# Patient Record
Sex: FEMALE | Race: OTHER | NOT HISPANIC OR LATINO | URBAN - METROPOLITAN AREA
[De-identification: names, ages, dates, MRNs, and addresses within clinical notes are randomized per-mention and may not be internally consistent; named-entity substitution may affect disease eponyms.]

---

## 2020-02-01 ENCOUNTER — INPATIENT (INPATIENT)
Facility: HOSPITAL | Age: 85
LOS: 4 days | Discharge: HOME | DRG: 871 | End: 2020-02-06
Attending: STUDENT IN AN ORGANIZED HEALTH CARE EDUCATION/TRAINING PROGRAM
Payer: MEDICARE

## 2020-02-01 VITALS
OXYGEN SATURATION: 100 % | TEMPERATURE: 98 F | RESPIRATION RATE: 17 BRPM | DIASTOLIC BLOOD PRESSURE: 57 MMHG | SYSTOLIC BLOOD PRESSURE: 106 MMHG | HEART RATE: 70 BPM

## 2020-02-01 DIAGNOSIS — E46 UNSPECIFIED PROTEIN-CALORIE MALNUTRITION: ICD-10-CM

## 2020-02-01 DIAGNOSIS — R63.8 OTHER SYMPTOMS AND SIGNS CONCERNING FOOD AND FLUID INTAKE: ICD-10-CM

## 2020-02-01 DIAGNOSIS — Z91.89 OTHER SPECIFIED PERSONAL RISK FACTORS, NOT ELSEWHERE CLASSIFIED: ICD-10-CM

## 2020-02-01 DIAGNOSIS — J18.9 PNEUMONIA, UNSPECIFIED ORGANISM: ICD-10-CM

## 2020-02-01 DIAGNOSIS — Z86.79 PERSONAL HISTORY OF OTHER DISEASES OF THE CIRCULATORY SYSTEM: ICD-10-CM

## 2020-02-01 DIAGNOSIS — I10 ESSENTIAL (PRIMARY) HYPERTENSION: ICD-10-CM

## 2020-02-01 DIAGNOSIS — R62.7 ADULT FAILURE TO THRIVE: ICD-10-CM

## 2020-02-01 DIAGNOSIS — I26.99 OTHER PULMONARY EMBOLISM WITHOUT ACUTE COR PULMONALE: ICD-10-CM

## 2020-02-01 DIAGNOSIS — A41.9 SEPSIS, UNSPECIFIED ORGANISM: ICD-10-CM

## 2020-02-01 DIAGNOSIS — E11.9 TYPE 2 DIABETES MELLITUS WITHOUT COMPLICATIONS: ICD-10-CM

## 2020-02-01 LAB
GGT SERPL-CCNC: 43 U/L — HIGH (ref 8–40)
GLUCOSE BLDC GLUCOMTR-MCNC: 81 MG/DL — SIGNIFICANT CHANGE UP (ref 70–99)
LACTATE SERPL-SCNC: 1.2 MMOL/L — SIGNIFICANT CHANGE UP (ref 0.5–2)
LACTATE SERPL-SCNC: 2.2 MMOL/L — HIGH (ref 0.5–2)
TROPONIN T SERPL-MCNC: 0.2 NG/ML — CRITICAL HIGH (ref 0–0.01)

## 2020-02-01 PROCEDURE — 71045 X-RAY EXAM CHEST 1 VIEW: CPT | Mod: 26

## 2020-02-01 PROCEDURE — 99223 1ST HOSP IP/OBS HIGH 75: CPT | Mod: GC

## 2020-02-01 PROCEDURE — 71250 CT THORAX DX C-: CPT | Mod: 26

## 2020-02-01 PROCEDURE — 99291 CRITICAL CARE FIRST HOUR: CPT

## 2020-02-01 PROCEDURE — 70450 CT HEAD/BRAIN W/O DYE: CPT | Mod: 26

## 2020-02-01 RX ORDER — DEXTROSE 50 % IN WATER 50 %
25 SYRINGE (ML) INTRAVENOUS ONCE
Refills: 0 | Status: DISCONTINUED | OUTPATIENT
Start: 2020-02-01 | End: 2020-02-05

## 2020-02-01 RX ORDER — BRIMONIDINE TARTRATE 2 MG/MG
1 SOLUTION/ DROPS OPHTHALMIC THREE TIMES A DAY
Refills: 0 | Status: DISCONTINUED | OUTPATIENT
Start: 2020-02-01 | End: 2020-02-06

## 2020-02-01 RX ORDER — FOLIC ACID 0.8 MG
1 TABLET ORAL DAILY
Refills: 0 | Status: DISCONTINUED | OUTPATIENT
Start: 2020-02-01 | End: 2020-02-02

## 2020-02-01 RX ORDER — RIVAROXABAN 15 MG-20MG
15 KIT ORAL
Refills: 0 | Status: DISCONTINUED | OUTPATIENT
Start: 2020-02-01 | End: 2020-02-02

## 2020-02-01 RX ORDER — CALCIUM CARBONATE 500(1250)
1 TABLET ORAL THREE TIMES A DAY
Refills: 0 | Status: DISCONTINUED | OUTPATIENT
Start: 2020-02-01 | End: 2020-02-02

## 2020-02-01 RX ORDER — ASPIRIN/CALCIUM CARB/MAGNESIUM 324 MG
81 TABLET ORAL DAILY
Refills: 0 | Status: DISCONTINUED | OUTPATIENT
Start: 2020-02-01 | End: 2020-02-02

## 2020-02-01 RX ORDER — VANCOMYCIN HCL 1 G
1000 VIAL (EA) INTRAVENOUS ONCE
Refills: 0 | Status: COMPLETED | OUTPATIENT
Start: 2020-02-01 | End: 2020-02-01

## 2020-02-01 RX ORDER — SODIUM CHLORIDE 9 MG/ML
500 INJECTION INTRAMUSCULAR; INTRAVENOUS; SUBCUTANEOUS ONCE
Refills: 0 | Status: COMPLETED | OUTPATIENT
Start: 2020-02-01 | End: 2020-02-01

## 2020-02-01 RX ORDER — DEXTROSE 50 % IN WATER 50 %
12.5 SYRINGE (ML) INTRAVENOUS ONCE
Refills: 0 | Status: DISCONTINUED | OUTPATIENT
Start: 2020-02-01 | End: 2020-02-05

## 2020-02-01 RX ORDER — FOLIC ACID 0.8 MG
1 TABLET ORAL
Qty: 0 | Refills: 0 | DISCHARGE

## 2020-02-01 RX ORDER — DEXTROSE 50 % IN WATER 50 %
15 SYRINGE (ML) INTRAVENOUS ONCE
Refills: 0 | Status: DISCONTINUED | OUTPATIENT
Start: 2020-02-01 | End: 2020-02-05

## 2020-02-01 RX ORDER — PIPERACILLIN AND TAZOBACTAM 4; .5 G/20ML; G/20ML
3.38 INJECTION, POWDER, LYOPHILIZED, FOR SOLUTION INTRAVENOUS ONCE
Refills: 0 | Status: COMPLETED | OUTPATIENT
Start: 2020-02-01 | End: 2020-02-01

## 2020-02-01 RX ORDER — ACETAMINOPHEN 500 MG
650 TABLET ORAL EVERY 6 HOURS
Refills: 0 | Status: DISCONTINUED | OUTPATIENT
Start: 2020-02-01 | End: 2020-02-06

## 2020-02-01 RX ORDER — CEFEPIME 1 G/1
1000 INJECTION, POWDER, FOR SOLUTION INTRAMUSCULAR; INTRAVENOUS EVERY 12 HOURS
Refills: 0 | Status: DISCONTINUED | OUTPATIENT
Start: 2020-02-02 | End: 2020-02-02

## 2020-02-01 RX ORDER — LATANOPROST 0.05 MG/ML
1 SOLUTION/ DROPS OPHTHALMIC; TOPICAL AT BEDTIME
Refills: 0 | Status: DISCONTINUED | OUTPATIENT
Start: 2020-02-01 | End: 2020-02-06

## 2020-02-01 RX ORDER — ATORVASTATIN CALCIUM 80 MG/1
10 TABLET, FILM COATED ORAL AT BEDTIME
Refills: 0 | Status: DISCONTINUED | OUTPATIENT
Start: 2020-02-01 | End: 2020-02-02

## 2020-02-01 RX ORDER — GLUCAGON INJECTION, SOLUTION 0.5 MG/.1ML
1 INJECTION, SOLUTION SUBCUTANEOUS ONCE
Refills: 0 | Status: DISCONTINUED | OUTPATIENT
Start: 2020-02-01 | End: 2020-02-05

## 2020-02-01 RX ORDER — LATANOPROST 0.05 MG/ML
1 SOLUTION/ DROPS OPHTHALMIC; TOPICAL
Qty: 0 | Refills: 0 | DISCHARGE

## 2020-02-01 RX ORDER — BRIMONIDINE TARTRATE 2 MG/MG
1 SOLUTION/ DROPS OPHTHALMIC
Qty: 0 | Refills: 0 | DISCHARGE

## 2020-02-01 RX ORDER — VANCOMYCIN HCL 1 G
1000 VIAL (EA) INTRAVENOUS EVERY 24 HOURS
Refills: 0 | Status: DISCONTINUED | OUTPATIENT
Start: 2020-02-02 | End: 2020-02-02

## 2020-02-01 RX ORDER — SODIUM CHLORIDE 9 MG/ML
1000 INJECTION, SOLUTION INTRAVENOUS
Refills: 0 | Status: DISCONTINUED | OUTPATIENT
Start: 2020-02-01 | End: 2020-02-05

## 2020-02-01 RX ORDER — METOPROLOL TARTRATE 50 MG
1 TABLET ORAL
Qty: 0 | Refills: 0 | DISCHARGE

## 2020-02-01 RX ORDER — INSULIN LISPRO 100/ML
VIAL (ML) SUBCUTANEOUS
Refills: 0 | Status: DISCONTINUED | OUTPATIENT
Start: 2020-02-01 | End: 2020-02-05

## 2020-02-01 RX ADMIN — SODIUM CHLORIDE 500 MILLILITER(S): 9 INJECTION INTRAMUSCULAR; INTRAVENOUS; SUBCUTANEOUS at 18:17

## 2020-02-01 RX ADMIN — Medication 0: at 22:36

## 2020-02-01 RX ADMIN — Medication 250 MILLIGRAM(S): at 18:47

## 2020-02-01 RX ADMIN — PIPERACILLIN AND TAZOBACTAM 200 GRAM(S): 4; .5 INJECTION, POWDER, LYOPHILIZED, FOR SOLUTION INTRAVENOUS at 18:14

## 2020-02-01 NOTE — CONSULT NOTE ADULT - ASSESSMENT
89 y/o female with dementia (AAOx3 at baseline), HTN, DM, SSS s/p pacemaker, osteoporosis, glaucoma  and recent hospitalization at [facility] presenting with generalized weakness and lethargy.    #SIRS  Patient meeting 2/4 SIRS criteria in ED; T 93.2 and WBC 2.49. Recent admissions at outside hospitals with MSSA bacteremia and respiratory failure 2/2 HAP, treated w/ vanc/Zosyn and Intubated . No reported respiratory sx since discharge. CXR with bilateral pleural effusions but not clear infiltrate.  Lactate 2.2.   -c/w Vanc/Zosyn for empiric coverage pending infection work-up.  -f/u UA, Bcx  -f/u CT chest    #Failure to thrive  Patient with generalized lethargy, poor PO intake. Unable to care for herself. May be 2/2 progression of dementia vs acute infectious progress.  Per family would like patient to be DNR/DNI but okay with all other interventions.   -PT and social work consult  -Treatment of possible underlying infection as above.  -Ongoing GOC w/ family  #Encephalopathy  Patient lethargic and not following commands; per family she was previously AAOx3 and active until around October 2019. May be progression of dementia vs toxic metabolic given recent hospitalizations for multiple infections  Moves all extremities spontaneously; no focal deficits  -f/u CT head  -Monitor for changes in mental status    #Troponemia  Troponin elevated to 0.22. EKG with atrial pacing but no acute ST abnormalities. Likely 2/2 demand ischemia.  Echo on 1/21/2020 at [facility] showing normal LV and Rv systolic function normal PA pressure.   -stat cardiac enzymes and EKG if new chest pain    #Pulmonary embolism  Per CTA chest at [facility] showing left segmental PE, was on enoxaparin and transitioned to rivaroxaban.   -c/w rivaroxaban 15 mg BID x 21 days, then bridge to 10 mg QD.    Dispo: 89 y/o female with dementia (AAOx3 at baseline), HTN, DM, SSS s/p pacemaker, osteoporosis, glaucoma  and recent hospitalization at [facility] presenting with generalized weakness and lethargy.    #SIRS  Patient meeting 2/4 SIRS criteria in ED; T 93.2 and WBC 2.49. Recent admissions at outside hospitals with MSSA bacteremia and respiratory failure 2/2 HAP, treated w/ vanc/Zosyn and Intubated . No reported respiratory sx since discharge. CXR with bilateral pleural effusions but not clear infiltrate.  Lactate 2.2. UA with < 5 WBC so low suspicion for UTI.  -c/w Vanc/Zosyn for empiric coverage pending infection work-up.  -f/u UA, Bcx  -f/u CT chest    #Failure to thrive  Patient with generalized lethargy, poor PO intake. Unable to care for herself. May be 2/2 progression of dementia vs acute infectious progress.  Per family would like patient to be DNR/DNI but okay with all other interventions.   -PT and social work consult  -Treatment of possible underlying infection as above.  -Ongoing GOC w/ family  #Encephalopathy  Patient lethargic and not following commands; per family she was previously AAOx3 and active until around October 2019. May be progression of dementia vs toxic metabolic given recent hospitalizations for multiple infections  Moves all extremities spontaneously; no focal deficits  -f/u CT head  -Monitor for changes in mental status    #Troponemia  Troponin elevated to 0.22. EKG with atrial pacing but no acute ST abnormalities. Likely 2/2 demand ischemia.  Echo on 1/21/2020 at [facility] showing normal LV and Rv systolic function normal PA pressure.   -stat cardiac enzymes and EKG if new chest pain    #Pulmonary embolism  Per CTA chest at [facility] showing left segmental PE, was on enoxaparin and transitioned to rivaroxaban.   -c/w rivaroxaban 15 mg BID x 21 days, then bridge to 10 mg QD.    Dispo: 89 y/o female with dementia (AAOx3 at baseline), HTN, DM, SSS s/p pacemaker, osteoporosis, glaucoma  and recent hospitalization at [facility] presenting with generalized weakness and lethargy.    #SIRS  Patient meeting 2/4 SIRS criteria in ED; T 93.2 and WBC 2.49. Recent admissions at outside hospitals with MSSA bacteremia and respiratory failure 2/2 HAP, treated w/ vanc/Zosyn and Intubated . No reported respiratory sx since discharge. CXR with bilateral pleural effusions but not clear infiltrate.  Lactate 2.2. UA with < 5 WBC so low suspicion for UTI.  -c/w Vanc/Zosyn for empiric coverage pending infection work-up.  -f/u UA, Bcx  -f/u CT chest    #Failure to thrive  Patient with generalized lethargy, poor PO intake. Unable to care for herself. May be 2/2 progression of dementia vs acute infectious progress.  Per family would like patient to be DNR/DNI but okay with all other interventions.   -PT and social work consult  -Treatment of possible underlying infection as above.  -Ongoing GOC w/ family  #Encephalopathy  Patient lethargic and not following commands; per family she was previously AAOx3 and active until around October 2019. May be progression of dementia vs toxic metabolic given recent hospitalizations for multiple infections  Moves all extremities spontaneously; no focal deficits  -f/u CT head  -Monitor for changes in mental status    #Troponemia  Troponin elevated to 0.22. EKG with atrial pacing but no acute ST abnormalities. Likely 2/2 demand ischemia.  Echo on 1/21/2020 at [facility] showing normal LV and Rv systolic function normal PA pressure.   -If concern for ACS, would recommend cardiology evaluation  -stat cardiac enzymes and EKG if new chest pain    #Pulmonary embolism  Per CTA chest at [facility] showing left segmental PE, was on enoxaparin and transitioned to rivaroxaban.   -c/w rivaroxaban 15 mg BID x 21 days, then bridge to 10 mg QD.    Dispo: 89 y/o female with dementia (AAOx3 at baseline), HTN, DM, SSS s/p pacemaker, osteoporosis, glaucoma  and recent hospitalization at [facility] presenting with generalized weakness and lethargy.    #SIRS, r/o sepsis   Patient meeting 2/4 SIRS criteria in ED; T 93.2 and WBC 2.49. Concern for sepsis given recent admissions at outside hospitals with MSSA bacteremia and respiratory failure 2/2 HAP, treated w/ vanc/Zosyn and Intubated . No reported respiratory sx since discharge. CXR with bilateral pleural effusions but not clear infiltrate.  Lactate 2.2. UA with < 5 WBC so low suspicion for UTI. Hemodynamically stable in ED.   -recommend w/ c/w Vanc/Zosyn for empiric coverage pending infection work-up.  -f/u UA, Bcx  -f/u CT chest    #Failure to thrive  Patient with generalized lethargy, poor PO intake. Unable to care for herself. May be 2/2 progression of dementia vs acute infectious progress.  Per family would like patient to be DNR/DNI but okay with all other interventions.   -PT and social work consult  -Treatment of possible underlying infection as above.  -Ongoing GOC w/ family    #Encephalopathy  Patient lethargic and not following commands; per family she was previously AAOx3 and active until around October 2019. May be progression of dementia vs toxic metabolic given recent hospitalizations for multiple infections  Moves all extremities spontaneously; no focal deficits  -f/u CT head  -Monitor for changes in mental status    #Troponemia  Troponin elevated to 0.22. EKG with atrial pacing but no acute ST abnormalities. Likely 2/2 demand ischemia.  Echo on 1/21/2020 at [facility] showing normal LV and Rv systolic function normal PA pressure.   -If concern for ACS, would recommend cardiology evaluation  -stat cardiac enzymes and EKG if new chest pain    #Pulmonary embolism  Per CTA chest at [facility] showing left segmental PE, was on enoxaparin and transitioned to rivaroxaban.   -c/w rivaroxaban 15 mg BID x 21 days, then bridge to 10 mg QD.    Dispo: stable for RMF at this time 89 y/o female with dementia (AAOx3 at baseline), HTN, DM, SSS s/p pacemaker, osteoporosis, glaucoma  and recent hospitalization at [facility] presenting with generalized weakness and lethargy.    #SIRS, r/o sepsis   Patient meeting 2/4 SIRS criteria in ED; T 93.2 and WBC 2.49. Concern for sepsis given recent admissions at outside hospitals with MSSA bacteremia and respiratory failure 2/2 HAP, treated w/ vanc/Zosyn and Intubated . No reported respiratory sx since discharge. CXR with bilateral pleural effusions but not clear infiltrate.  Lactate 2.2. UA with < 5 WBC so low suspicion for UTI. Hemodynamically stable in ED.   -recommend w/ c/w Vanc/Zosyn for empiric coverage pending infection work-up.  -f/u UA, Bcx  -f/u CT chest    #Failure to thrive  Patient with generalized lethargy, poor PO intake. Unable to care for herself. In ED patient was awake and somnolent but arousable. May be 2/2 progression of dementia vs acute infectious progress.  Per family would like patient to be DNR/DNI but okay with all other interventions.   -PT and social work consult  -Treatment of possible underlying infection as above.  -Ongoing GOC w/ family    #Encephalopathy  Patient lethargic and not following commands; per family she was previously AAOx3 and active until around October 2019. May be progression of dementia vs toxic metabolic given recent hospitalizations for multiple infections  Moves all extremities spontaneously; no focal deficits  -f/u CT head  -Monitor for changes in mental status    #Troponemia  Troponin elevated to 0.22. EKG with atrial pacing but no acute ST abnormalities. Likely 2/2 demand ischemia.  Echo on 1/21/2020 at [facility] showing normal LV and Rv systolic function normal PA pressure.   -If concern for ACS, would recommend cardiology evaluation  -stat cardiac enzymes and EKG if new chest pain    #Pulmonary embolism  Per CTA chest at [facility] showing left segmental PE, was on enoxaparin and transitioned to rivaroxaban.   -c/w rivaroxaban 15 mg BID x 21 days, then bridge to 10 mg QD.    Dispo: stable for RMF at this time 89 y/o female with dementia (AAOx3 at baseline), HTN, DM, SSS s/p pacemaker, osteoporosis, glaucoma  and recent hospitalization at [facility] presenting with generalized weakness and lethargy.    #SIRS, r/o sepsis   Patient meeting 2/4 SIRS criteria in ED; T 93.2 and WBC 2.49. Concern for sepsis given recent admissions at outside hospitals with MSSA bacteremia and respiratory failure 2/2 HAP, treated w/ vanc/Zosyn and Intubated . No reported respiratory sx since discharge. CXR with bilateral pleural effusions but not clear infiltrate.  Lactate 2.2. UA with < 5 WBC so low suspicion for UTI. Hemodynamically stable in ED.   -recommend w/ c/w Vanc/Zosyn for empiric coverage pending infection work-up.  -f/u UA, Bcx  -f/u CT chest    #Failure to thrive  Patient with generalized lethargy, poor PO intake. Unable to care for herself. In ED patient was awake and somnolent but arousable. May be 2/2 progression of dementia vs acute infectious progress.  Per family would like patient to be DNR/DNI but okay with all other interventions.   -PT and social work consult  -Treatment of possible underlying infection as above.  -Ongoing GOC w/ family    #Encephalopathy  Patient lethargic and not following commands; per family she was previously AAOx3 and active until around October 2019. May be progression of dementia vs toxic metabolic given recent hospitalizations for multiple infections  Moves all extremities spontaneously; no focal deficits  -f/u CT head  -Monitor for changes in mental status    #Troponemia  Troponin elevated to 0.22. EKG with atrial pacing but no acute ST abnormalities. Likely 2/2 demand ischemia.  Echo on 1/21/2020 at [facility] showing normal LV and Rv systolic function normal PA pressure.   -If concern for ACS, would recommend cardiology evaluation  -stat cardiac enzymes and EKG if new chest pain    #Pulmonary embolism  Per CTA chest at [facility] showing left segmental PE, was on enoxaparin and transitioned to rivaroxaban.   -c/w rivaroxaban.     Dispo: stable for RMF at this time

## 2020-02-01 NOTE — H&P ADULT - NSICDXPASTMEDICALHX_GEN_ALL_CORE_FT
PAST MEDICAL HISTORY:  Diabetes mellitus     H/O sick sinus syndrome     Hypertension     Pulmonary embolus

## 2020-02-01 NOTE — H&P ADULT - PROBLEM SELECTOR PLAN 8
F: None  E: Replete PRN  N:  Prophylaxis: Xarelto   Code: DNR/DNI  Dispo: [facility] F: None  E: Replete PRN  N: CC Diet Pureed   Prophylaxis: Xarelto   Code: DNR/DNI  Dispo: [facility] - Home Regimen: Metformin 500mg BID  - MISS  - CC Diet (Pureed) - hold diet pending bedside dysphagia, S+S also ordered.

## 2020-02-01 NOTE — ED ADULT NURSE NOTE - CHIEF COMPLAINT QUOTE
Per ems patient more confused than usual.  Pt recently treated for pneumonia at [facility] 3 days ago.  Hx Dementia, pacemaker, DM.  FS 89

## 2020-02-01 NOTE — H&P ADULT - PROBLEM SELECTOR PLAN 7
- Home Regimen: Metformin 500mg BID  - MISS  - CC Diet (Pureed) - Patient found to have a L subsegmental PE on her last hospitalization and records indicate she was meant to be discharged on Xarelto, which was not sent to the pharmacy.   - Xarelto 15mg BID - this is for 3 weeks, patient presumably has been on A/C for about 1 week during last hospitalization. Would continue for another 2 weeks and switch to 20mg OD dosing thereafter.   - Patient unable to swallow at this time, continue with Enoxaparin 65mg BID (treatment dose).

## 2020-02-01 NOTE — H&P ADULT - PROBLEM SELECTOR PLAN 5
- PPM in place   - Holding Metoprolol 50mg BID in the setting of sepsis - Patient with progressive decline in functional status since Oct 2019 with recurrent hospitalizations. Reportedly, further worsening after recent ICU stay post intubation.   - Family aware of this and understanding of age related dementia and worsening weakness related to hospitalizations.   - Decision made to continue DNR/DNI status   - Would consider palliative care consult.

## 2020-02-01 NOTE — ED PROVIDER NOTE - CRITICAL CARE PROVIDED
conducted a detailed discussion of DNR status/additional history taking/documentation/consult w/ pt's family directly relating to pts condition/interpretation of diagnostic studies/consultation with other physicians/direct patient care (not related to procedure)

## 2020-02-01 NOTE — H&P ADULT - PROBLEM SELECTOR PLAN 6
- Patient found to have a L subsegmental PE on her last hospitalization and records indicate she was meant to be discharged on Xarelto, which was not sent to the pharmacy.   - C/w Xarelto 15mg BID - this is for 3 weeks, patient presumably has been on A/C for about 1 week during last hospitalization. Would continue for another 2 weeks and switch to 20mg OD dosing thereafter.   - C/w Xarelto 15mg BID - PPM in place   - Holding Metoprolol 50mg BID in the setting of sepsis

## 2020-02-01 NOTE — ED PROVIDER NOTE - CLINICAL SUMMARY MEDICAL DECISION MAKING FREE TEXT BOX
89y F with advanced dementia and discharged yesterday after critical illness with pneumonia, possible bacteremia, pulmonary embolism, and respiratory failure with possible syncopal episode today. Will consult cardiology for interrogation of pace maker to determine if rhythm is abnormal, will evaluate for metabolic abnormality and infection. If work up is reassuring will likely discharged as patient is with 24 hour help at home. 89y F with advanced dementia and discharged yesterday after critical illness with pneumonia, possible bacteremia, pulmonary embolism, and respiratory failure with possible syncopal episode today. Will consult cardiology for interrogation of pace maker to determine if rhythm is abnormal, will evaluate for metabolic abnormality and infection. If work up is reassuring will likely discharged as patient is with 24 hour help at home.    Foll 89y F with advanced dementia and discharged yesterday after critical illness with pneumonia, possible bacteremia, pulmonary embolism, and respiratory failure with possible syncopal episode today. Will consult cardiology for interrogation of pace maker to determine if rhythm is abnormal, will evaluate for metabolic abnormality and infection. If work up is reassuring will likely discharged as patient is with 24 hour help at home.  FOLLOW UP:   due to hypothermia identified, elevated lactic, and question of infiltrate on xray , consider possible sepsis, antibiotics empirically given,  ICU consulted and advised ok for floor, less likely cardiac event, trop improved, no dysrthmia here,   DNR DNI as per discussion w granddaughter, hypothermia improving.     Foll

## 2020-02-01 NOTE — PATIENT PROFILE ADULT - NSPROEDALEARNPREFOTH_GEN_A_NUR
audio/skill demonstration/group instruction/pictorial/verbal instruction/video/computer/internet/individual instruction/written material

## 2020-02-01 NOTE — ED ADULT NURSE NOTE - NSIMPLEMENTINTERV_GEN_ALL_ED
Implemented All Fall with Harm Risk Interventions:  [place] to call system. Call bell, personal items and telephone within reach. Instruct patient to call for assistance. Room bathroom lighting operational. Non-slip footwear when patient is off stretcher. Physically safe environment: no spills, clutter or unnecessary equipment. Stretcher in lowest position, wheels locked, appropriate side rails in place. Provide visual cue, wrist band, yellow gown, etc. Monitor gait and stability. Monitor for mental status changes and reorient to person, place, and time. Review medications for side effects contributing to fall risk. Reinforce activity limits and safety measures with patient and family. Provide visual clues: red socks.

## 2020-02-01 NOTE — H&P ADULT - PROBLEM SELECTOR PLAN 4
- Patient with progressive decline in functional status since Oct 2019 with recurrent hospitalizations. Reportedly, further worsening after recent ICU stay post intubation.   - Family aware of this and understanding of age related dementia and worsening weakness related to hospitalizations.   - Decision made to continue DNR/DNI status   - Would consider palliative care consult. - CT Chest demonstrated mild fat stranding - clinically, difficult to assess pain as patient is non-verbal, however, with palpation, patient actively tried to remove hand.   - Alk Phos elevated, f/u GGT  - F/u RUQ sonogram   - C/w antibiotics as above for coverage of enteric pathogens   - Should this represent acute cholecystitis, family have determined they would not pursue surgery.

## 2020-02-01 NOTE — H&P ADULT - NSHPPHYSICALEXAM_GEN_ALL_CORE
PHYSICAL EXAM:  Constitutional: Patient seated comfortably in bed, of appropriate color, nutrition, and hydration.   HEENT: PERRLA, Normal Range of eye movement with no complaint of diplopia, Normal Hearing  Neck: No LAD, No JVD  Back: Normal spine flexure, No CVA tenderness  Respiratory: Normal air entry, Lungs clear to auscultation b/l w/o wheeze or crepitations.   Cardiovascular: S1 and S2 present - no additional abnormal sounds or murmurs. Normal rhythm and rate of pulse.   Gastrointestinal: BS+, soft, NT/ND  Extremities: No peripheral edema  Vascular: 2+ peripheral pulses  Neurological: A/O x 3, CN V-XII grossly intact.  Psychiatric: Normal mood, normal affect  Musculoskeletal: 5/5 strength b/l upper and lower extremities  Skin: No rashes PHYSICAL EXAM:  Constitutional: Elderly AA female, lying in bed, awake but not participatory in exam, non-verbal.   HEENT: PERRLA  Neck: No LAD, No JVD  Back: Normal spine flexure, No CVA tenderness  Respiratory: On RA, normal inspiratory effort. Coarse crackles at both lung bases.   Cardiovascular: S1 and S2 present - no additional abnormal sounds or murmurs. PPM in place.   Gastrointestinal: Difficult to assess, but, when pressing the RUQ, patient tried to remove my hands. Non-distended. BS+   Extremities: 1-2+ edema in the b/l UE and LE.   Vascular: 2+ peripheral pulses  Neurological: A/O x 0. No obvious CN deficit. Not participating in strength exam, but able to fight my advances, moving all 4 limbs. Normal tone.

## 2020-02-01 NOTE — H&P ADULT - NSHPLABSRESULTS_GEN_ALL_CORE
9.2    2.49  )-----------( 89       ( 2020 14:55 )             27.4     144  |  109<H>  |  16  ----------------------------<  87  4.5   |  23  |  1.08    Ca    8.7      2020 14:55  Mg     1.7     -    TPro  6.2  /  Alb  2.4<L>  /  TBili  0.6  /  DBili  x   /  AST  41<H>  /  ALT  17  /  AlkPhos  125<H>  02        Urinalysis Basic - ( 2020 15:37 )    Color: Yellow / Appearance: Cloudy / S.025 / pH: x  Gluc: x / Ketone: NEGATIVE  / Bili: Negative / Urobili: 0.2 E.U./dL   Blood: x / Protein: Trace mg/dL / Nitrite: POSITIVE   Leuk Esterase: NEGATIVE / RBC: 5-10 /HPF / WBC < 5 /HPF   Sq Epi: x / Non Sq Epi: Moderate /HPF / Bacteria: Present /HPF    PT/INR - ( 2020 14:55 )   PT: 12.2 sec;   INR: 1.07          PTT - ( 2020 14:55 )  PTT:45.0 sec    Lactate Trend  - @ 18:59 Lactate:1.2    @ 15:00 Lactate:2.2       CARDIAC MARKERS ( 2020 18:59 )  x     / 0.20 ng/mL / x     / x     / x      CARDIAC MARKERS ( 2020 14:55 )  x     / 0.22 ng/mL / x     / x     / x

## 2020-02-01 NOTE — CONSULT NOTE ADULT - SUBJECTIVE AND OBJECTIVE BOX
***NOTE INCOMPLETE***    ICU CONSULT    89 y/o female with dementia, pace maker, and recent hospitalization at [facility] with initial peritonitis and developed pneumonia and new pulmonary embolism, and MSSA bacteremia and respiratory failure intubated in ICU and discharged yesterday. Given Xarelto for pulmonary embolism as per discharge instructions however not at the pharmacy when she went to pick it up today. Patient's granddaughter reports she is surprised her grandmother was discharged yesterday and went to help her with the 24 hour aid today and patient was slumped over in wheelchair, granddaughter is unsure if she syncopized or fell asleep. Patient's granddaughter reports she feels that patient was not ready to be discharged and is persistently weak, no fever, no cough, and no other complaints of pain.     Granddaughter reports confusion as initially told that her grandmother would be discharged with pic line but on review of discharge paperwork infectious disease at [place] determined suspected blood cultures were contaminated and advised discontinuation of antibiotics.    In the ED:  Initial vital signs: T: 93.2-98.2 F, HR: 69-70, BP: 104//57, R: 16-17, SpO2: 100% on RA  Labs: significant for WBC 2.49, Hb 9.2, lactate 2.2, albumin 2.2, troponin T 0.22, pro-BNP 5695  Imaging:  CXR: small bilateral effusions, R > L. Cannot exclude infiltrate.   EKG: low voltage, atrial pacemaker, no acute ST abnormalities   Medications: vancomycin, Zosyn, 500 cc bolus   Consults: ICU    Past Medical History: as above    Past Surgical History    Allergies - no known drug allergies    Family History - non-contributory    Social History - denies smoking, alcohol use, or illicit drug use     Review of Systems - negative except per HPI Above       Vital Signs Last 24 Hrs  T(C): 34.8 (2020 17:40), Max: 36.8 (2020 14:32)  T(F): 94.6 (2020 17:40), Max: 98.2 (2020 14:32)  HR: 69 (2020 17:40) (69 - 70)  BP: 104/61 (2020 17:40) (104/61 - 106/57)  BP(mean): --  RR: 16 (2020 17:40) (16 - 17)  SpO2: 100% (2020 17:40) (100% - 100%)    PHYSICAL EXAM   General: no acute distress; appears stated age  HEENT: normocephalic, atraumatic, non-icteric sclera, PERRL, EOMI, oropharynx clear  Neck: supple; no JVD or LAD  Chest: no gross deformities  Lungs: clear to ausculation bilaterally; no wheezing, crackles, or rhonchi  Heart: regular rate and rhythm; S1 and S2; no murmurs, rubs, or gallops  Abdomen: soft, non-tender, non-distended; normal bowel sounds; no hepatosplenomegaly  MSK: back non-tender to palpation  Extremities: well-perfused, 2+ extremity pulses, no cyanosis or edema  Skin: no rashes  Neuro: AAOx3; no gross focal deficits       LABS                        9.2    2.49  )-----------( 89       ( 2020 14:55 )             27.4         144  |  109<H>  |  16  ----------------------------<  87  4.5   |  23  |  1.08    Ca    8.7      2020 14:55  Mg     1.7         TPro  6.2  /  Alb  2.4<L>  /  TBili  0.6  /  DBili  x   /  AST  41<H>  /  ALT  17  /  AlkPhos  125<H>      PT/INR - ( 2020 14:55 )   PT: 12.2 sec;   INR: 1.07          PTT - ( 2020 14:55 )  PTT:45.0 sec  Lactate, Blood: 2.2 mmol/L (20 @ 15:00)    Urinalysis Basic - ( 2020 15:37 )    Color: Yellow / Appearance: Cloudy / S.025 / pH: x  Gluc: x / Ketone: NEGATIVE  / Bili: Negative / Urobili: 0.2 E.U./dL   Blood: x / Protein: Trace mg/dL / Nitrite: POSITIVE   Leuk Esterase: NEGATIVE / RBC: 5-10 /HPF / WBC < 5 /HPF   Sq Epi: x / Non Sq Epi: Moderate /HPF / Bacteria: Present /HPF            IMAGING       INTERPRETATION: Clinical History: Pneumonia     Frontal examination of the chest demonstrates the heart to be within normal   limits in transverse diameter. Pacemaker overlies left chest wall. Clips and   pacer wires overlie right atrium and right ventricle respectively. Small   bilateral effusions. Underlying basilar infiltrates cannot be excluded.   Calcification noted involving thoracic aorta. Degenerative changes thoracic   spine.     Impression: Small bilateral effusions right greater than left. Underlying   basilar infiltrate cannot be excluded ***NOTE INCOMPLETE***    ICU CONSULT    89 y/o female with dementia (AAOx3 at [facility]), HTN, DM, SSS s/p pacemaker, osteoporosis, glaucoma  and recent hospitalization at [facility] presenting with generalized weakness and lethargy. Patient hospitalized at [place] with initial parotitis and MSSA bacteremia. Discharged home but presented to [facility] on 1/15/2020 with poor PO intake and generalized weakness since discharge. Admitted to ICU and  treated with vancomycin and cefepime for HAP and intubated and admitted to ICU, and found to have new left segmental PE pulmonary embolism, treated with enoxaparin and then switched to rivaroxaban. Patient intially w/ PICC line placed but MSSA bacteremia deemed by [facility] ID team to be contaminant and PICC line removed. Patient was discharged home yesterday, and  given Xarelto for pulmonary embolism as per discharge instructions however not at the pharmacy when she went to pick it up today.     Patient's granddaughter reports she is surprised her grandmother was discharged yesterday and went to help her with the 24 hour aid today and patient was slumped over in wheelchair, but denies any head injury or LOC. Patient's granddaughter reports she feels that patient was not ready to be discharged and since discharge is persistently weak. Patient unable to verbalize complaints but per complaints, but family, patient with no fever, no cough, sore throat SOB, or pain complaints since discharge. Reports at baseline she was AAOx3 and very active until around 2019; since then she has had multiple hospitalizations and has persistently remained lethargic.     In the ED:  Initial vital signs: T: 93.2-98.2 F, HR: 69-70, BP: 104//57, R: 16-17, SpO2: 100% on RA  Labs: significant for WBC 2.49, Hb 9.2, lactate 2.2, albumin 2.2, troponin T 0.22, pro-BNP 5695  Imaging:  CXR: small bilateral effusions, R > L. Cannot exclude infiltrate.   EKG: low voltage, atrial pacemaker, no acute ST abnormalities   Medications: vancomycin, Zosyn, 500 cc bolus   Consults: ICU    Past Medical History: as above    Past Surgical History: cataract surgery     Allergies - no known drug allergies    Family History - non-contributory    Social History - no smoking, alcohol use, or illicit drug use     Review of Systems - unable to obtain as patient non-verbal at this time    Home Meds:  aspirin 81 mg QD   atorvastatin 10 mg QHS  bisacodyl 5 mg QD PRN  brimonidine eye drops  calcium carbonate 1250 mg QD  folic acid 1 mg QD  latanoprost 0.005% 1 drop both eyes QD  metformin 500 mg BID  metoprolol tartrate 50 mg BID  Miralax 1 packet daily  rivaroxaban 15 mg BID    Vital Signs Last 24 Hrs  T(C): 34.8 (2020 17:40), Max: 36.8 (2020 14:32)  T(F): 94.6 (2020 17:40), Max: 98.2 (2020 14:32)  HR: 69 (2020 17:40) (69 - 70)  BP: 104/61 (2020 17:40) (104/61 - 106/57)  BP(mean): --  RR: 16 (2020 17:40) (16 - 17)  SpO2: 100% (2020 17:40) (100% - 100%)    PHYSICAL EXAM   General: elderly female; no acute distress; appears stated age  HEENT: normocephalic, atraumatic, non-icteric sclera, PERRL, dry mucous membranes   Neck: supple  Lungs: clear to ausculation bilaterally; no wheezing, crackles, or rhonchi  Heart: regular rate and rhythm; S1 and S2; no murmurs, rubs, or gallops  Abdomen: soft, non-tender, non-distended; normal bowel sounds; no hepatosplenomegaly  Extremities: well-perfused, 2+ extremity pulses, no cyanosis or edema  Skin: poor skin turgor   Neuro: awake but lethargic, oriented x0. Moves all extremities spontaneously but unable to follow commands.        LABS                        9.2    2.49  )-----------( 89       ( 2020 14:55 )             27.4     02-    144  |  109<H>  |  16  ----------------------------<  87  4.5   |  23  |  1.08    Ca    8.7      2020 14:55  Mg     1.7     02-    TPro  6.2  /  Alb  2.4<L>  /  TBili  0.6  /  DBili  x   /  AST  41<H>  /  ALT  17  /  AlkPhos  125<H>      PT/INR - ( 2020 14:55 )   PT: 12.2 sec;   INR: 1.07          PTT - ( 2020 14:55 )  PTT:45.0 sec  Lactate, Blood: 2.2 mmol/L (20 @ 15:00)    Urinalysis Basic - ( 2020 15:37 )    Color: Yellow / Appearance: Cloudy / S.025 / pH: x  Gluc: x / Ketone: NEGATIVE  / Bili: Negative / Urobili: 0.2 E.U./dL   Blood: x / Protein: Trace mg/dL / Nitrite: POSITIVE   Leuk Esterase: NEGATIVE / RBC: 5-10 /HPF / WBC < 5 /HPF   Sq Epi: x / Non Sq Epi: Moderate /HPF / Bacteria: Present /HPF            IMAGING       INTERPRETATION: Clinical History: Pneumonia     Frontal examination of the chest demonstrates the heart to be within normal   limits in transverse diameter. Pacemaker overlies left chest wall. Clips and   pacer wires overlie right atrium and right ventricle respectively. Small   bilateral effusions. Underlying basilar infiltrates cannot be excluded.   Calcification noted involving thoracic aorta. Degenerative changes thoracic   spine.     Impression: Small bilateral effusions right greater than left. Underlying   basilar infiltrate cannot be excluded ICU CONSULT    89 y/o female with dementia (AAOx3 at [facility]), HTN, DM, SSS s/p pacemaker, osteoporosis, glaucoma  and recent hospitalization at [facility] presenting with generalized weakness and lethargy. Patient hospitalized at [place] with initial parotitis and MSSA bacteremia. Discharged home but presented to [facility] on 1/15/2020 with poor PO intake and generalized weakness since discharge. Admitted to ICU and  treated with vancomycin and cefepime for HAP and intubated and admitted to ICU, and found to have new left segmental PE pulmonary embolism, treated with enoxaparin and then switched to rivaroxaban. Patient intially w/ PICC line placed but MSSA bacteremia deemed by [facility] ID team to be contaminant and PICC line removed. Patient was discharged home yesterday, and  given Xarelto for pulmonary embolism as per discharge instructions however not at the pharmacy when she went to pick it up today.     Patient's granddaughter reports she is surprised her grandmother was discharged yesterday and went to help her with the 24 hour aid today and patient was slumped over in wheelchair, but denies any head injury or LOC. Patient's granddaughter reports she feels that patient was not ready to be discharged and since discharge is persistently weak. Patient unable to verbalize complaints but per complaints, but family, patient with no fever, no cough, sore throat SOB, or pain complaints since discharge. Reports at baseline she was AAOx3 and very active until around 2019; since then she has had multiple hospitalizations and has persistently remained lethargic.     In the ED:  Initial vital signs: T: 93.2-98.2 F, HR: 69-70, BP: 104//57, R: 16-17, SpO2: 100% on RA  Labs: significant for WBC 2.49, Hb 9.2, lactate 2.2, albumin 2.2, troponin T 0.22, pro-BNP 5695  Imaging:  CXR: small bilateral effusions, R > L. Cannot exclude infiltrate.   EKG: low voltage, atrial pacemaker, no acute ST abnormalities   Medications: vancomycin, Zosyn, 500 cc bolus   Consults: ICU    Past Medical History: as above    Past Surgical History: cataract surgery     Allergies - no known drug allergies    Family History - non-contributory    Social History - no smoking, alcohol use, or illicit drug use     Review of Systems - unable to obtain as patient non-verbal at this time    Home Meds:  aspirin 81 mg QD   atorvastatin 10 mg QHS  bisacodyl 5 mg QD PRN  brimonidine eye drops  calcium carbonate 1250 mg QD  folic acid 1 mg QD  latanoprost 0.005% 1 drop both eyes QD  metformin 500 mg BID  metoprolol tartrate 50 mg BID  Miralax 1 packet daily  rivaroxaban 15 mg BID    Vital Signs Last 24 Hrs  T(C): 34.8 (2020 17:40), Max: 36.8 (2020 14:32)  T(F): 94.6 (2020 17:40), Max: 98.2 (2020 14:32)  HR: 69 (2020 17:40) (69 - 70)  BP: 104/61 (2020 17:40) (104/61 - 106/57)  BP(mean): --  RR: 16 (2020 17:40) (16 - 17)  SpO2: 100% (2020 17:40) (100% - 100%)    PHYSICAL EXAM   General: elderly female; no acute distress; appears stated age  HEENT: normocephalic, atraumatic, non-icteric sclera, PERRL, dry mucous membranes   Neck: supple  Lungs: clear to ausculation bilaterally; no wheezing, crackles, or rhonchi  Heart: regular rate and rhythm; S1 and S2; no murmurs, rubs, or gallops  Abdomen: soft, non-tender, non-distended; normal bowel sounds; no hepatosplenomegaly  Extremities: well-perfused, 2+ extremity pulses, no cyanosis or edema  Skin: poor skin turgor   Neuro: awake but lethargic, oriented x0. Moves all extremities spontaneously but unable to follow commands.        LABS                        9.2    2.49  )-----------( 89       ( 2020 14:55 )             27.4     02-01    144  |  109<H>  |  16  ----------------------------<  87  4.5   |  23  |  1.08    Ca    8.7      2020 14:55  Mg     1.7     02-01    TPro  6.2  /  Alb  2.4<L>  /  TBili  0.6  /  DBili  x   /  AST  41<H>  /  ALT  17  /  AlkPhos  125<H>      PT/INR - ( 2020 14:55 )   PT: 12.2 sec;   INR: 1.07          PTT - ( 2020 14:55 )  PTT:45.0 sec  Lactate, Blood: 2.2 mmol/L (20 @ 15:00)    Urinalysis Basic - ( 2020 15:37 )    Color: Yellow / Appearance: Cloudy / S.025 / pH: x  Gluc: x / Ketone: NEGATIVE  / Bili: Negative / Urobili: 0.2 E.U./dL   Blood: x / Protein: Trace mg/dL / Nitrite: POSITIVE   Leuk Esterase: NEGATIVE / RBC: 5-10 /HPF / WBC < 5 /HPF   Sq Epi: x / Non Sq Epi: Moderate /HPF / Bacteria: Present /HPF            IMAGING       INTERPRETATION: Clinical History: Pneumonia     Frontal examination of the chest demonstrates the heart to be within normal   limits in transverse diameter. Pacemaker overlies left chest wall. Clips and   pacer wires overlie right atrium and right ventricle respectively. Small   bilateral effusions. Underlying basilar infiltrates cannot be excluded.   Calcification noted involving thoracic aorta. Degenerative changes thoracic   spine.     Impression: Small bilateral effusions right greater than left. Underlying   basilar infiltrate cannot be excluded ICU CONSULT    89 y/o female with dementia (AAOx3 at [facility]), HTN, DM, SSS s/p pacemaker, osteoporosis, glaucoma  and recent hospitalization at [facility] presenting with generalized weakness and lethargy. Patient hospitalized at [place] with initial parotitis and MSSA bacteremia. Discharged home but presented to [facility] on 1/15/2020 with poor PO intake and generalized weakness since discharge. Admitted to ICU and  treated with vancomycin and cefepime for HAP and intubated and admitted to ICU, and found to have new left segmental PE pulmonary embolism, treated with enoxaparin and then switched to rivaroxaban. Patient intially w/ PICC line placed but MSSA bacteremia deemed by [facility] ID team to be contaminant and PICC line removed. Patient was discharged home yesterday, and  given Xarelto for pulmonary embolism as per discharge instructions however not at the pharmacy when she went to pick it up today.     Patient's granddaughter reports she is surprised her grandmother was discharged yesterday and went to help her with the 24 hour aid today and patient was slumped over in wheelchair, but denies any head injury or LOC. Patient's granddaughter reports she feels that patient was not ready to be discharged and since discharge is persistently weak. Patient unable to verbalize complaints but per complaints, but family, patient with no fever, no cough, sore throat SOB, or pain complaints since discharge. Reports at baseline she was AAOx3 and very active until around 2019; since then she has had multiple hospitalizations and has persistently remained lethargic.     In the ED:  Initial vital signs: T: 93.2-98.2 F, HR: 69-70, BP: 104//57, R: 16-17, SpO2: 100% on RA  Labs: significant for WBC 2.49, Hb 9.2, lactate 2.2, albumin 2.2, troponin T 0.22, pro-BNP 5695  Imaging:  CXR: small bilateral effusions, R > L. Cannot exclude infiltrate.   EKG: low voltage, atrial pacemaker, no acute ST abnormalities   Medications: vancomycin, Zosyn, 500 cc bolus   Consults: ICU    Past Medical History: as above    Past Surgical History: cataract surgery     Allergies - no known drug allergies    Family History - non-contributory    Social History - no smoking, alcohol use, or illicit drug use     Review of Systems - unable to obtain as patient non-verbal at this time    Home Meds:  aspirin 81 mg QD   atorvastatin 10 mg QHS  bisacodyl 5 mg QD PRN  brimonidine eye drops  calcium carbonate 1250 mg QD  folic acid 1 mg QD  latanoprost 0.005% 1 drop both eyes QD  metformin 500 mg BID  metoprolol tartrate 50 mg BID  Miralax 1 packet daily  rivaroxaban 15 mg BID    Vital Signs Last 24 Hrs  T(C): 34.8 (2020 17:40), Max: 36.8 (2020 14:32)  T(F): 94.6 (2020 17:40), Max: 98.2 (2020 14:32)  HR: 69 (2020 17:40) (69 - 70)  BP: 104/61 (2020 17:40) (104/61 - 106/57)  BP(mean): --  RR: 16 (2020 17:40) (16 - 17)  SpO2: 100% (2020 17:40) (100% - 100%)    PHYSICAL EXAM   General: elderly female; no acute distress; appears stated age  HEENT: normocephalic, atraumatic, non-icteric sclera, PERRL, dry mucous membranes   Neck: supple  Lungs: clear to ausculation bilaterally; no wheezing, crackles, or rhonchi  Heart: regular rate and rhythm; S1 and S2; no murmurs, rubs, or gallops  Abdomen: soft, non-tender, non-distended; normal bowel sounds; no hepatosplenomegaly  Extremities: well-perfused, 2+ extremity pulses, no cyanosis or edema  Skin: poor skin turgor   Neuro: awake, somnolent but arousable, oriented x0. Moves all extremities spontaneously but unable to follow commands.        LABS                        9.2    2.49  )-----------( 89       ( 2020 14:55 )             27.4     02-01    144  |  109<H>  |  16  ----------------------------<  87  4.5   |  23  |  1.08    Ca    8.7      2020 14:55  Mg     1.7     02-01    TPro  6.2  /  Alb  2.4<L>  /  TBili  0.6  /  DBili  x   /  AST  41<H>  /  ALT  17  /  AlkPhos  125<H>      PT/INR - ( 2020 14:55 )   PT: 12.2 sec;   INR: 1.07          PTT - ( 2020 14:55 )  PTT:45.0 sec  Lactate, Blood: 2.2 mmol/L (20 @ 15:00)    Urinalysis Basic - ( 2020 15:37 )    Color: Yellow / Appearance: Cloudy / S.025 / pH: x  Gluc: x / Ketone: NEGATIVE  / Bili: Negative / Urobili: 0.2 E.U./dL   Blood: x / Protein: Trace mg/dL / Nitrite: POSITIVE   Leuk Esterase: NEGATIVE / RBC: 5-10 /HPF / WBC < 5 /HPF   Sq Epi: x / Non Sq Epi: Moderate /HPF / Bacteria: Present /HPF            IMAGING       INTERPRETATION: Clinical History: Pneumonia     Frontal examination of the chest demonstrates the heart to be within normal   limits in transverse diameter. Pacemaker overlies left chest wall. Clips and   pacer wires overlie right atrium and right ventricle respectively. Small   bilateral effusions. Underlying basilar infiltrates cannot be excluded.   Calcification noted involving thoracic aorta. Degenerative changes thoracic   spine.     Impression: Small bilateral effusions right greater than left. Underlying   basilar infiltrate cannot be excluded

## 2020-02-01 NOTE — ED PROVIDER NOTE - NEUROLOGICAL, MLM
Alert, no focal deficits, no motor or sensory deficits; muttering unintelligibly, granddaughter reports mental status at baseline; oriented to granddaughter but not to self, place, or time.

## 2020-02-01 NOTE — ED ADULT TRIAGE NOTE - CHIEF COMPLAINT QUOTE
Per ems patient more confused than usual.  Pt [facility].  Hx Dementia, pacemaker, DM.  FS 89

## 2020-02-01 NOTE — ED ADULT NURSE REASSESSMENT NOTE - NS ED NURSE REASSESS COMMENT FT1
Pt straight catheterized for urine as ordered by MD Aparicio. Pt currently resting comfortably in bed under bear hugger. Will continue with plan of care.

## 2020-02-01 NOTE — ED PROVIDER NOTE - OBJECTIVE STATEMENT
89y F with a history of dementia, pace maker, and recent hospitalization with initial peritonitis and developed pneumonia and new pulmonary embolism, and mssa bacteremia and respiratory failure intubated in ICU and discharged yesterday. Given Xarelto for pulmonary embolism as per discharge instructions however not at the pharmacy when she went to pick it up today. Patient's granddaughter reports she is surprised her grandmother was discharged yesterday and went to help her with the 24 hour aid today and patient was slumped over in wheelchair, granddaughter is unsure if she syncopized or fell asleep. Patient's granddaughter reports she feels that patient was not ready to be discharged and is persistently weak, no fever, no cough, and no other complaints of pain. Granddaughter reports confusion as initially told that her grandmother would be discharged with pic line but on review of discharge paperwork infectious disease at [place] determined suspected blood cultures were contaminated and advised discontinuation of antibiotics. 89y F with a history of dementia, pace maker, and recent hospitalization with initial parotitis and developed pneumonia and new pulmonary embolism, and mssa bacteremia and respiratory failure intubated in ICU and discharged yesterday. Given Xarelto for pulmonary embolism as per discharge instructions however not at the pharmacy when she went to pick it up today. Patient's granddaughter reports she is surprised her grandmother was discharged yesterday and went to help her with the 24 hour aid today and patient was slumped over in wheelchair, granddaughter is unsure if she syncopized or fell asleep. Patient's granddaughter reports she feels that patient was not ready to be discharged and is persistently weak, no fever, no cough, and no other complaints of pain. Granddaughter reports confusion as initially told that her grandmother would be discharged with pic line but on review of discharge paperwork infectious disease at [place] determined suspected blood cultures were contaminated and advised discontinuation of antibiotics. 89y F with a history of dementia, pace maker, and recent hospitalization with initial parotidis and developed pneumonia and new pulmonary embolism, and mssa bacteremia and respiratory failure intubated in ICU and discharged yesterday. Given Xarelto for pulmonary embolism as per discharge instructions however not at the pharmacy when she went to pick it up today. Patient's granddaughter reports she is surprised her grandmother was discharged yesterday and went to help her with the 24 hour aid today and patient was slumped over in wheelchair, granddaughter is unsure if she syncopized or fell asleep. Patient's granddaughter reports she feels that patient was not ready to be discharged and is persistently weak, no fever, no cough, and no other complaints of pain. Granddaughter reports confusion as initially told that her grandmother would be discharged with pic line but on review of discharge paperwork infectious disease at [place] determined suspected blood cultures were contaminated and advised discontinuation of antibiotics.

## 2020-02-01 NOTE — H&P ADULT - PROBLEM SELECTOR PLAN 1
- Patient meeting SIRS criteria w/ hypothermia and leucopenia w/ source being attributed to a pneumonia, as evident on CT Chest.   - Lactate 2.2 - cleared w/ 500cc NS  - Patient did not receive 30cc/kg fluid resuscitation given presence of b/l pleural effusions and elevated BNP. However, patient is warm w/ palpable pulses on exam.   - UA negative   - Other potential source is her gallbladder, w/ CT reading stranding around organ and stones. Alk Phos elevated.   - C/w antibiotic treatment as below   - F/u blood cultures - Patient meeting SIRS criteria w/ hypothermia and leucopenia w/ source being attributed to a pneumonia, as evident on CT Chest.   - Lactate 2.2 - cleared w/ 500cc NS  - Patient did not receive 30cc/kg fluid resuscitation given presence of b/l pleural effusions and elevated BNP. However, patient is warm w/ palpable pulses on exam.   - UA negative   - Other potential source is her gallbladder, w/ CT reading stranding around organ and stones. Alk Phos elevated.   - C/w antibiotic treatment as below   - F/u blood cultures    #Demand Ischemia  - Initial troponinemia, since peaked in the setting of sepsis   - No distinct ischemic changes on EKG  - Elevated BNP w/ edema   - F/u ECHO - Patient meeting SIRS criteria w/ hypothermia and leucopenia w/ source being attributed to a pneumonia, as evident on CT Chest with possible cholecystitis evident as well.   - Lactate 2.2 - cleared w/ 500cc NS  - Patient did not receive 30cc/kg fluid resuscitation given presence of b/l pleural effusions and elevated BNP. However, patient is warm w/ palpable pulses on exam.   - UA negative   - Other potential source is her gallbladder, w/ CT reading stranding around organ and stones. Alk Phos and ggt elevated.   - C/w antibiotic treatment as below   - F/u blood cultures    #Demand Ischemia  - Initial troponinemia, since peaked in the setting of sepsis   - No distinct ischemic changes on EKG  - Elevated BNP w/ edema   - F/u ECHO

## 2020-02-01 NOTE — H&P ADULT - PROBLEM SELECTOR PLAN 3
- CT Chest demonstrated mild fat stranding - clinically, difficult to assess pain as patient is non-verbal, however, with palpation, patient actively tried to remove hand.   - Alk Phos elevated, f/u GGT  - F/u RUQ sonogram   - C/w antibiotics as above for coverage of enteric pathogens   - Should this represent acute cholecystitis, family have determined they would not pursue surgery. - CXR illustrated b/l effusions and possible infiltrate, with consolidation ultimately confirmed on subsequent CT Chest.   - Given recent hospital DC yesterday, will treat this as a hospital acquired pneumonia   - F/y RVP  - C/w Vanc 1g QD and Zosyn   - Patient at this time is on room air with appropriate respiratory status   - F/u daily CXR

## 2020-02-01 NOTE — H&P ADULT - HISTORY OF PRESENT ILLNESS
89 y/o female with dementia (AAOx3 in Oct 2019, w/ decline), HTN, DM, SSS s/p pacemaker, Osteoporosis, Glaucoma, Pulmonary Embolism and recent hospitalization at [facility] presenting with generalized weakness and lethargy. Patient hospitalized at [place] with initial parotitis and MSSA bacteremia. Discharged home but presented to [facility] on 1/15/2020 with poor PO intake and generalized weakness since discharge. Admitted to ICU and  treated with vancomycin and cefepime for HAP and intubated and admitted to ICU, and found to have new left segmental PE pulmonary embolism, treated with enoxaparin and then switched to rivaroxaban. Patient intially w/ PICC line placed but MSSA bacteremia deemed by [facility] ID team to be contaminant and PICC line removed. Patient was discharged home yesterday, and given Xarelto for pulmonary embolism as per discharge instructions however not at the pharmacy when she went to pick it up today.     Patient's granddaughter reports she is surprised her grandmother was discharged yesterday and went to help her with the 24 hour aid today and patient was slumped over in wheelchair, but denies any head injury or LOC. Patient's granddaughter reports she feels that patient was not ready to be discharged and since discharge is persistently weak. Patient unable to verbalize complaints but per complaints, but family, patient with no fever, no cough, sore throat SOB, or pain complaints since discharge. Reports at baseline she was AAOx3 and very active until around October 2019; since then she has had multiple hospitalizations and has persistently remained lethargic.    In the ED, vitals as follows: Tmin: 93.2F | HR: 69-76bpm | BP: /53-64mmHg | RR: 16/min | SpO2: % RA    Labs significant for: WBC 2.49, Hgb 9.2 (Elev RDW), Plt 89, Lactate 2.2 -> 1.2, Albumin 2.4, Alk Phos 125, AST 41, ALT 17, Trop 0.22 -> 0.2, BNP > 5,000, Blood Gas wnl, UA [SG 1.025, Hyaline and Granular casts]   Imaging:  CTH  No hydrocephalus, midline shift, acute intracranial hemorrhage or demarcated territorial infarct.    CT Chest  Moderate-sized bilateral pleural effusions with overlying consolidation. Cannot exclude underlying bibasilar infiltrates. Correlate clinically.  Cholelithiasis. Punctate calcifications within the region of the distal cystic/common hepatic duct suspicious for cortical cholelithiasis. Possible mild stranding of the fat surrounding the gallbladder may represent active inflammatory change. Correlate whether the patient is experiencing any tenderness within the right upper quadrant.  EKG:    Patient was administered: Vanc/Zosyn, NS 500cc 89 y/o female with dementia (AAOx3 in Oct 2019, w/ decline), HTN, DM, SSS s/p pacemaker, Osteoporosis, Glaucoma, Pulmonary Embolism and recent hospitalization at [facility] presenting with generalized weakness and lethargy. Patient hospitalized at [place] with initial parotitis and MSSA bacteremia. Discharged home but presented to [facility] on 1/15/2020 with poor PO intake and generalized weakness since discharge. Admitted to ICU after being intubated for hypoxic respiratory failure and was treated with vancomycin and cefepime for HAP. She was also found to have a new left segmental PE pulmonary embolism, treated with enoxaparin and then switched to rivaroxaban. Patient initially w/ PICC line placed but MSSA bacteremia deemed by [facility] ID team to be contaminant and PICC line removed. Patient was discharged home yesterday, and given Xarelto for pulmonary embolism as per discharge instructions however not at the pharmacy when she went to pick it up today.     Patient's granddaughter reports she is surprised her grandmother was discharged yesterday and went to help her with the 24 hour aid today and patient was slumped over in wheelchair, but denies any head injury or LOC. Patient's granddaughter reports she feels that patient was not ready to be discharged and since discharge is persistently weak. Patient unable to verbalize complaints but, per family, patient with no fever, no cough, sore throat SOB, or pain since discharge. Reports at baseline she was AAOx3 and very active until around October 2019; since then she has had multiple hospitalizations and has persistently remained lethargic.     In the ED, vitals as follows: Tmin: 93.2F | HR: 69-76bpm | BP: /53-64mmHg | RR: 16/min | SpO2: % RA    Labs significant for: WBC 2.49, Hgb 9.2 (Elev RDW), Plt 89, Lactate 2.2 -> 1.2, Albumin 2.4, Alk Phos 125, AST 41, ALT 17, Trop 0.22 -> 0.2, BNP > 5,000, Blood Gas wnl, UA [SG 1.025, Hyaline and Granular casts]   Imaging:  CTH  No hydrocephalus, midline shift, acute intracranial hemorrhage or demarcated territorial infarct.    CT Chest  Moderate-sized bilateral pleural effusions with overlying consolidation. Cannot exclude underlying bibasilar infiltrates. Correlate clinically.  Cholelithiasis. Punctate calcifications within the region of the distal cystic/common hepatic duct suspicious for cortical cholelithiasis. Possible mild stranding of the fat surrounding the gallbladder may represent active inflammatory change. Correlate whether the patient is experiencing any tenderness within the right upper quadrant.    Patient was administered: Vanc/Zosyn, NS 500cc

## 2020-02-01 NOTE — H&P ADULT - PROBLEM SELECTOR PLAN 2
- CXR illustrated b/l effusions and possible infiltrate, with consolidation ultimately confirmed on subsequent CT Chest.   - Given recent hospital DC yesterday, will treat this as a hospital acquired pneumonia   - C/w Vanc + Zosyn   - Patient at this time is on room air with appropriate respiratory status   - F/u daily CXR - CXR illustrated b/l effusions and possible infiltrate, with consolidation ultimately confirmed on subsequent CT Chest.   - Given recent hospital DC yesterday, will treat this as a hospital acquired pneumonia   - C/w Vanc 1g QD and Cefepime 1g q 12 hours based on Cr Cl   - Patient at this time is on room air with appropriate respiratory status   - F/u daily CXR - Patient with leucopenia, anemia, and thrombocytopenia   - No e/o bleeding or bruising   - Suspect related to marrow suppression in the setting of sepsis   - C/w enoxaparin for tx of PE - hold if platelet count drops further.   - Will work up anemia w/ iron studies, B12, Folate - Patient with leucopenia, anemia, and thrombocytopenia   - No e/o bleeding or bruising   - Suspect related to marrow suppression in the setting of sepsis   - C/w enoxaparin for tx of PE - hold if platelet count drops further.   - Will work up anemia w/ iron studies, B12, Folate, retic count

## 2020-02-01 NOTE — H&P ADULT - ASSESSMENT
89 y/o female with dementia (AAOx3 in Oct 2019, w/ decline), HTN, DM, SSS s/p pacemaker, osteoporosis, glaucoma  and recent hospitalization at [facility] presenting with generalized weakness and lethargy. Patient admitted for severe sepsis secondary to pneumonia, covering for hospital acquired sources.

## 2020-02-01 NOTE — ED PROVIDER NOTE - ENMT, MLM
Airway patent, Nasal mucosa clear. Mouth with normal mucosa. Throat has no vesicles, no oropharyngeal exudates and uvula is midline. Dry mucous membranes.

## 2020-02-01 NOTE — H&P ADULT - PROBLEM SELECTOR PLAN 9
1) PCP Contacted on Admission: (Y/N) --> Name & Phone #:  2) Date of Contact with PCP:  3) PCP Contacted at Discharge: (Y/N)  4) Summary of Handoff Given to PCP:   5) Post-Discharge Appointment Date and Location: F: None  E: Replete PRN  N: CC Diet Pureed   Prophylaxis: Xarelto   Code: DNR/DNI  Dispo: [facility]

## 2020-02-01 NOTE — ED ADULT NURSE REASSESSMENT NOTE - NS ED NURSE REASSESS COMMENT FT1
Upon assessment stage 2 pressure ulcer noted to right sacrum. Pt repeat rectal temperature 94.5, MD Aparicio aware, pt maintained on bear hugger. Will continue with plan of care.

## 2020-02-01 NOTE — ED ADULT NURSE NOTE - OBJECTIVE STATEMENT
Pt is a 89 y/o female BIBA accompanied by home health aid & granddaughter c/o "altered mental status." As per granddaughter and HHA pt normally A&Ox4 and talking, upon ED arrival pt disoriented to person, place and time but speaking coherently, as per granddaughter this started "after she passed out while sitting down." HHA denies N/V/D, fever/chills, head injury/falls, or any complaints of pain prior to syncope. Upon assessment full ROM in all extremities,  strength equal and strong bilaterally, edema noted to LUE. Pt's granddaughter reports pt was recently discharged from "ICU in [place] after being taken off a ventilator."

## 2020-02-02 DIAGNOSIS — D61.818 OTHER PANCYTOPENIA: ICD-10-CM

## 2020-02-02 LAB
ANION GAP SERPL CALC-SCNC: 11 MMOL/L — SIGNIFICANT CHANGE UP (ref 5–17)
BUN SERPL-MCNC: 18 MG/DL — SIGNIFICANT CHANGE UP (ref 7–23)
CALCIUM SERPL-MCNC: 8.5 MG/DL — SIGNIFICANT CHANGE UP (ref 8.4–10.5)
CHLORIDE SERPL-SCNC: 110 MMOL/L — HIGH (ref 96–108)
CO2 SERPL-SCNC: 21 MMOL/L — LOW (ref 22–31)
CREAT SERPL-MCNC: 1.11 MG/DL — SIGNIFICANT CHANGE UP (ref 0.5–1.3)
FERRITIN SERPL-MCNC: 498 NG/ML — HIGH (ref 15–150)
FOLATE SERPL-MCNC: 15 NG/ML — SIGNIFICANT CHANGE UP
GLUCOSE BLDC GLUCOMTR-MCNC: 68 MG/DL — LOW (ref 70–99)
GLUCOSE BLDC GLUCOMTR-MCNC: 80 MG/DL — SIGNIFICANT CHANGE UP (ref 70–99)
GLUCOSE SERPL-MCNC: 83 MG/DL — SIGNIFICANT CHANGE UP (ref 70–99)
HBA1C BLD-MCNC: 5.1 % — SIGNIFICANT CHANGE UP (ref 4–5.6)
HCT VFR BLD CALC: 24.1 % — LOW (ref 34.5–45)
HGB BLD-MCNC: 8.2 G/DL — LOW (ref 11.5–15.5)
IRON SATN MFR SERPL: 47 % — SIGNIFICANT CHANGE UP (ref 14–50)
IRON SATN MFR SERPL: 61 UG/DL — SIGNIFICANT CHANGE UP (ref 30–160)
MAGNESIUM SERPL-MCNC: 1.6 MG/DL — SIGNIFICANT CHANGE UP (ref 1.6–2.6)
MCHC RBC-ENTMCNC: 33.1 PG — SIGNIFICANT CHANGE UP (ref 27–34)
MCHC RBC-ENTMCNC: 34 GM/DL — SIGNIFICANT CHANGE UP (ref 32–36)
MCV RBC AUTO: 97.2 FL — SIGNIFICANT CHANGE UP (ref 80–100)
NRBC # BLD: 0 /100 WBCS — SIGNIFICANT CHANGE UP (ref 0–0)
PHOSPHATE SERPL-MCNC: 3 MG/DL — SIGNIFICANT CHANGE UP (ref 2.5–4.5)
PLATELET # BLD AUTO: 77 K/UL — LOW (ref 150–400)
POTASSIUM SERPL-MCNC: 3.4 MMOL/L — LOW (ref 3.5–5.3)
POTASSIUM SERPL-SCNC: 3.4 MMOL/L — LOW (ref 3.5–5.3)
RAPID RVP RESULT: SIGNIFICANT CHANGE UP
RBC # BLD: 2.48 M/UL — LOW (ref 3.8–5.2)
RBC # FLD: 19.5 % — HIGH (ref 10.3–14.5)
SODIUM SERPL-SCNC: 142 MMOL/L — SIGNIFICANT CHANGE UP (ref 135–145)
TIBC SERPL-MCNC: 129 UG/DL — LOW (ref 220–430)
UIBC SERPL-MCNC: 68 UG/DL — LOW (ref 110–370)
VIT B12 SERPL-MCNC: 1374 PG/ML — HIGH (ref 232–1245)
WBC # BLD: 3.34 K/UL — LOW (ref 3.8–10.5)
WBC # FLD AUTO: 3.34 K/UL — LOW (ref 3.8–10.5)

## 2020-02-02 PROCEDURE — 99233 SBSQ HOSP IP/OBS HIGH 50: CPT | Mod: GC

## 2020-02-02 RX ORDER — FOLIC ACID 0.8 MG
1 TABLET ORAL DAILY
Refills: 0 | Status: DISCONTINUED | OUTPATIENT
Start: 2020-02-02 | End: 2020-02-06

## 2020-02-02 RX ORDER — PIPERACILLIN AND TAZOBACTAM 4; .5 G/20ML; G/20ML
4.5 INJECTION, POWDER, LYOPHILIZED, FOR SOLUTION INTRAVENOUS EVERY 8 HOURS
Refills: 0 | Status: DISCONTINUED | OUTPATIENT
Start: 2020-02-02 | End: 2020-02-02

## 2020-02-02 RX ORDER — POTASSIUM CHLORIDE 20 MEQ
10 PACKET (EA) ORAL
Refills: 0 | Status: COMPLETED | OUTPATIENT
Start: 2020-02-02 | End: 2020-02-02

## 2020-02-02 RX ORDER — KETOROLAC TROMETHAMINE 30 MG/ML
15 SYRINGE (ML) INJECTION ONCE
Refills: 0 | Status: DISCONTINUED | OUTPATIENT
Start: 2020-02-02 | End: 2020-02-02

## 2020-02-02 RX ORDER — MAGNESIUM SULFATE 500 MG/ML
2 VIAL (ML) INJECTION ONCE
Refills: 0 | Status: COMPLETED | OUTPATIENT
Start: 2020-02-02 | End: 2020-02-02

## 2020-02-02 RX ORDER — ENOXAPARIN SODIUM 100 MG/ML
65 INJECTION SUBCUTANEOUS EVERY 12 HOURS
Refills: 0 | Status: DISCONTINUED | OUTPATIENT
Start: 2020-02-02 | End: 2020-02-02

## 2020-02-02 RX ORDER — ENOXAPARIN SODIUM 100 MG/ML
65 INJECTION SUBCUTANEOUS EVERY 12 HOURS
Refills: 0 | Status: DISCONTINUED | OUTPATIENT
Start: 2020-02-02 | End: 2020-02-06

## 2020-02-02 RX ORDER — HALOPERIDOL DECANOATE 100 MG/ML
1 INJECTION INTRAMUSCULAR ONCE
Refills: 0 | Status: COMPLETED | OUTPATIENT
Start: 2020-02-02 | End: 2020-02-02

## 2020-02-02 RX ORDER — POTASSIUM CHLORIDE 20 MEQ
20 PACKET (EA) ORAL ONCE
Refills: 0 | Status: DISCONTINUED | OUTPATIENT
Start: 2020-02-02 | End: 2020-02-02

## 2020-02-02 RX ORDER — CEFTRIAXONE 500 MG/1
1000 INJECTION, POWDER, FOR SOLUTION INTRAMUSCULAR; INTRAVENOUS ONCE
Refills: 0 | Status: COMPLETED | OUTPATIENT
Start: 2020-02-02 | End: 2020-02-03

## 2020-02-02 RX ORDER — HALOPERIDOL DECANOATE 100 MG/ML
1 INJECTION INTRAMUSCULAR THREE TIMES A DAY
Refills: 0 | Status: DISCONTINUED | OUTPATIENT
Start: 2020-02-02 | End: 2020-02-03

## 2020-02-02 RX ADMIN — CEFEPIME 100 MILLIGRAM(S): 1 INJECTION, POWDER, FOR SOLUTION INTRAMUSCULAR; INTRAVENOUS at 01:47

## 2020-02-02 RX ADMIN — ENOXAPARIN SODIUM 65 MILLIGRAM(S): 100 INJECTION SUBCUTANEOUS at 17:39

## 2020-02-02 RX ADMIN — BRIMONIDINE TARTRATE 1 DROP(S): 2 SOLUTION/ DROPS OPHTHALMIC at 17:34

## 2020-02-02 RX ADMIN — ENOXAPARIN SODIUM 65 MILLIGRAM(S): 100 INJECTION SUBCUTANEOUS at 06:44

## 2020-02-02 RX ADMIN — HALOPERIDOL DECANOATE 1 MILLIGRAM(S): 100 INJECTION INTRAMUSCULAR at 11:00

## 2020-02-02 RX ADMIN — Medication 50 GRAM(S): at 09:41

## 2020-02-02 RX ADMIN — Medication 100 MILLIEQUIVALENT(S): at 09:42

## 2020-02-02 RX ADMIN — Medication 15 MILLIGRAM(S): at 00:56

## 2020-02-02 RX ADMIN — BRIMONIDINE TARTRATE 1 DROP(S): 2 SOLUTION/ DROPS OPHTHALMIC at 22:23

## 2020-02-02 RX ADMIN — PIPERACILLIN AND TAZOBACTAM 25 GRAM(S): 4; .5 INJECTION, POWDER, LYOPHILIZED, FOR SOLUTION INTRAVENOUS at 06:44

## 2020-02-02 RX ADMIN — Medication 15 MILLIGRAM(S): at 00:39

## 2020-02-02 RX ADMIN — BRIMONIDINE TARTRATE 1 DROP(S): 2 SOLUTION/ DROPS OPHTHALMIC at 06:45

## 2020-02-02 NOTE — DIETITIAN INITIAL EVALUATION ADULT. - PROBLEM SELECTOR PLAN 1
- Patient meeting SIRS criteria w/ hypothermia and leucopenia w/ source being attributed to a pneumonia, as evident on CT Chest with possible cholecystitis evident as well.   - Lactate 2.2 - cleared w/ 500cc NS  - Patient did not receive 30cc/kg fluid resuscitation given presence of b/l pleural effusions and elevated BNP. However, patient is warm w/ palpable pulses on exam.   - UA negative   - Other potential source is her gallbladder, w/ CT reading stranding around organ and stones. Alk Phos and ggt elevated.   - C/w antibiotic treatment as below   - F/u blood cultures    #Demand Ischemia  - Initial troponinemia, since peaked in the setting of sepsis   - No distinct ischemic changes on EKG  - Elevated BNP w/ edema   - F/u ECHO

## 2020-02-02 NOTE — SWALLOW BEDSIDE ASSESSMENT ADULT - NS SPL SWALLOW CLINIC TRIAL FT
Pt was initially calm upon SLP arrival. However, when SLP attempted to offer her liquid, she became increasingly agitated. She began drinking from the straw but bit down on it and refused to release it for a prolonged period of time. Pt drank ~2oz of water, occasionally holding liquid in her mouth and then expelling it back through the straw. Pt refused to allow SLP palpate for laryngeal elevation and began screaming, "No!" and swatting at the therapist. Pt refused all solid trials.

## 2020-02-02 NOTE — DIETITIAN INITIAL EVALUATION ADULT. - PROBLEM SELECTOR PLAN 7
- Patient found to have a L subsegmental PE on her last hospitalization and records indicate she was meant to be discharged on Xarelto, which was not sent to the pharmacy.   - Xarelto 15mg BID - this is for 3 weeks, patient presumably has been on A/C for about 1 week during last hospitalization. Would continue for another 2 weeks and switch to 20mg OD dosing thereafter.   - Patient unable to swallow at this time, continue with Enoxaparin 65mg BID (treatment dose).

## 2020-02-02 NOTE — PROGRESS NOTE ADULT - SUBJECTIVE AND OBJECTIVE BOX
INTERVAL HPI/OVERNIGHT EVENTS:  Patient was seen and examined at bedside.     Patient agitated at times, vocalizes but does not respond, even when spoken to in Creole. Unable to obtain ROS. She was too combative to have US abdomen.         VITAL SIGNS:  T(F): 97.2 (20 @ 05:11)  HR: 78 (20 @ 05:11)  BP: 123/77 (20 @ 05:11)  RR: 18 (20 @ 05:11)  SpO2: 100% (20 @ 05:11)  Wt(kg): --    PHYSICAL EXAM:    Constitutional: female, elderly, frail, , agitated  HEENT: arcus senilis, no JVD, MMM, edentulous  Respiratory: CTA b/l, however limited exam due to poor patient cooperation  Cardiovascular: RRR, normal S1S2, no M/R/G  Gastrointestinal: soft, patient combative during exam  Extremities: Warm, well perfused, no edema, no clubbing  Neurological: AAOx0, unable to assess CN,  Skin: normal temperature, warm, dry    MEDICATIONS  (STANDING):  brimonidine 0.2% Ophthalmic Solution 1 Drop(s) Both EYES three times a day  dextrose 5%. 1000 milliLiter(s) (50 mL/Hr) IV Continuous <Continuous>  dextrose 50% Injectable 12.5 Gram(s) IV Push once  dextrose 50% Injectable 25 Gram(s) IV Push once  dextrose 50% Injectable 25 Gram(s) IV Push once  enoxaparin Injectable 65 milliGRAM(s) SubCutaneous every 12 hours  folic acid Injectable 1 milliGRAM(s) IV Push daily  insulin lispro (HumaLOG) corrective regimen sliding scale   SubCutaneous Before meals and at bedtime  latanoprost 0.005% Ophthalmic Solution 1 Drop(s) Both EYES at bedtime  levoFLOXacin  Tablet 750 milliGRAM(s) Oral every 48 hours    MEDICATIONS  (PRN):  acetaminophen    Suspension .. 650 milliGRAM(s) Oral every 6 hours PRN Mild Pain (1 - 3)  dextrose 40% Gel 15 Gram(s) Oral once PRN Blood Glucose LESS THAN 70 milliGRAM(s)/deciliter  glucagon  Injectable 1 milliGRAM(s) IntraMuscular once PRN Glucose LESS THAN 70 milligrams/deciliter  haloperidol    Injectable 1 milliGRAM(s) IntraMuscular three times a day PRN agitation      Allergies    No Known Allergies    Intolerances        LABS:                        8.2    3.34  )-----------( 77       ( 2020 06:52 )             24.1     02    142  |  110<H>  |  18  ----------------------------<  83  3.4<L>   |  21<L>  |  1.11    Ca    8.5      2020 06:52  Phos  3.0       Mg     1.6         TPro  6.2  /  Alb  2.4<L>  /  TBili  0.6  /  DBili  x   /  AST  41<H>  /  ALT  17  /  AlkPhos  125<H>      PT/INR - ( 2020 14:55 )   PT: 12.2 sec;   INR: 1.07          PTT - ( 2020 14:55 )  PTT:45.0 sec  Urinalysis Basic - ( 2020 15:37 )    Color: Yellow / Appearance: Cloudy / S.025 / pH: x  Gluc: x / Ketone: NEGATIVE  / Bili: Negative / Urobili: 0.2 E.U./dL   Blood: x / Protein: Trace mg/dL / Nitrite: POSITIVE   Leuk Esterase: NEGATIVE / RBC: 5-10 /HPF / WBC < 5 /HPF   Sq Epi: x / Non Sq Epi: Moderate /HPF / Bacteria: Present /HPF      CAPILLARY BLOOD GLUCOSE      POCT Blood Glucose.: 68 mg/dL (2020 12:46)  POCT Blood Glucose.: 80 mg/dL (2020 09:40)  POCT Blood Glucose.: 81 mg/dL (2020 22:07) INTERVAL HPI/OVERNIGHT EVENTS:  Patient was seen and examined at bedside.     Patient agitated at times, vocalizes but does not respond, even when spoken to in Creole. Unable to obtain ROS. She was too combative to have US abdomen. Goals of care discussion continued today with son, Albert Villalpando [phone], at bedside.    VITAL SIGNS:  T(F): 97.2 (20 @ 05:11)  HR: 78 (20 @ 05:11)  BP: 123/77 (20 @ 05:11)  RR: 18 (20 @ 05:11)  SpO2: 100% (20 @ 05:11)  Wt(kg): --    PHYSICAL EXAM:    Constitutional: female, elderly, frail, , agitated  HEENT: arcus senilis, no JVD, MMM, edentulous  Respiratory: CTA b/l, however limited exam due to poor patient cooperation  Cardiovascular: RRR, normal S1S2, no M/R/G  Gastrointestinal: soft, patient combative during exam  Extremities: Warm, well perfused, no edema, no clubbing  Neurological: AAOx0, unable to assess CN,  Skin: normal temperature, warm, dry    MEDICATIONS  (STANDING):  brimonidine 0.2% Ophthalmic Solution 1 Drop(s) Both EYES three times a day  dextrose 5%. 1000 milliLiter(s) (50 mL/Hr) IV Continuous <Continuous>  dextrose 50% Injectable 12.5 Gram(s) IV Push once  dextrose 50% Injectable 25 Gram(s) IV Push once  dextrose 50% Injectable 25 Gram(s) IV Push once  enoxaparin Injectable 65 milliGRAM(s) SubCutaneous every 12 hours  folic acid Injectable 1 milliGRAM(s) IV Push daily  insulin lispro (HumaLOG) corrective regimen sliding scale   SubCutaneous Before meals and at bedtime  latanoprost 0.005% Ophthalmic Solution 1 Drop(s) Both EYES at bedtime  levoFLOXacin  Tablet 750 milliGRAM(s) Oral every 48 hours    MEDICATIONS  (PRN):  acetaminophen    Suspension .. 650 milliGRAM(s) Oral every 6 hours PRN Mild Pain (1 - 3)  dextrose 40% Gel 15 Gram(s) Oral once PRN Blood Glucose LESS THAN 70 milliGRAM(s)/deciliter  glucagon  Injectable 1 milliGRAM(s) IntraMuscular once PRN Glucose LESS THAN 70 milligrams/deciliter  haloperidol    Injectable 1 milliGRAM(s) IntraMuscular three times a day PRN agitation      Allergies    No Known Allergies    Intolerances        LABS:                        8.2    3.34  )-----------( 77       ( 2020 06:52 )             24.1     02-02    142  |  110<H>  |  18  ----------------------------<  83  3.4<L>   |  21<L>  |  1.11    Ca    8.5      2020 06:52  Phos  3.0     -  Mg     1.6         TPro  6.2  /  Alb  2.4<L>  /  TBili  0.6  /  DBili  x   /  AST  41<H>  /  ALT  17  /  AlkPhos  125<H>      PT/INR - ( 2020 14:55 )   PT: 12.2 sec;   INR: 1.07          PTT - ( 2020 14:55 )  PTT:45.0 sec  Urinalysis Basic - ( 2020 15:37 )    Color: Yellow / Appearance: Cloudy / S.025 / pH: x  Gluc: x / Ketone: NEGATIVE  / Bili: Negative / Urobili: 0.2 E.U./dL   Blood: x / Protein: Trace mg/dL / Nitrite: POSITIVE   Leuk Esterase: NEGATIVE / RBC: 5-10 /HPF / WBC < 5 /HPF   Sq Epi: x / Non Sq Epi: Moderate /HPF / Bacteria: Present /HPF      CAPILLARY BLOOD GLUCOSE      POCT Blood Glucose.: 68 mg/dL (2020 12:46)  POCT Blood Glucose.: 80 mg/dL (2020 09:40)  POCT Blood Glucose.: 81 mg/dL (2020 22:07)

## 2020-02-02 NOTE — DIETITIAN INITIAL EVALUATION ADULT. - PROBLEM SELECTOR PLAN 4
- CT Chest demonstrated mild fat stranding - clinically, difficult to assess pain as patient is non-verbal, however, with palpation, patient actively tried to remove hand.   - Alk Phos elevated, f/u GGT  - F/u RUQ sonogram   - C/w antibiotics as above for coverage of enteric pathogens   - Should this represent acute cholecystitis, family have determined they would not pursue surgery.

## 2020-02-02 NOTE — DIETITIAN INITIAL EVALUATION ADULT. - ENERGY NEEDS
Ht: 5'0", Wt: 66kg, IBW: 45.5kg, 145.0%IBW.   Calculations done using IBW as pt's current wt is >120% IBW. Needs calculated based on [facility] standards of care. Needs adjusted for age, pressure injury, sepsis, PNA. Upper end of kcal range should be used.

## 2020-02-02 NOTE — PROGRESS NOTE ADULT - PROBLEM SELECTOR PLAN 10
1) PCP Contacted on Admission: (Y/N) --> Name & Phone #:  2) Date of Contact with PCP:  3) PCP Contacted at Discharge: (Y/N)  4) Summary of Handoff Given to PCP:   5) Post-Discharge Appointment Date and Location: 1) PCP Contacted on Admission: (Y/N) --> Name & Phone #: Dr. Tere Lackey [phone]  2) Date of Contact with PCP:  3) PCP Contacted at Discharge: (Y/N)  4) Summary of Handoff Given to PCP:   5) Post-Discharge Appointment Date and Location:

## 2020-02-02 NOTE — DIETITIAN INITIAL EVALUATION ADULT. - PROBLEM SELECTOR PLAN 5
- Patient with progressive decline in functional status since Oct 2019 with recurrent hospitalizations. Reportedly, further worsening after recent ICU stay post intubation.   - Family aware of this and understanding of age related dementia and worsening weakness related to hospitalizations.   - Decision made to continue DNR/DNI status   - Would consider palliative care consult.

## 2020-02-02 NOTE — DIETITIAN INITIAL EVALUATION ADULT. - PROBLEM SELECTOR PLAN 2
- Patient with leucopenia, anemia, and thrombocytopenia   - No e/o bleeding or bruising   - Suspect related to marrow suppression in the setting of sepsis   - C/w enoxaparin for tx of PE - hold if platelet count drops further.   - Will work up anemia w/ iron studies, B12, Folate, retic count

## 2020-02-02 NOTE — DIETITIAN INITIAL EVALUATION ADULT. - OTHER INFO
Pt seen for initial assessment. 89 y/o female with dementia (AAOx3 in Oct 2019, w/ decline), HTN, DM, SSS s/p pacemaker, osteoporosis, glaucoma  and recent hospitalization at [facility] presenting with generalized weakness and lethargy. Patient admitted for severe sepsis secondary to pneumonia, covering for hospital acquired sources. Skin: 1+ edema b/l ankles, +stage II PI R buttocks, +stage II PI sacrum. Pt seen resting in bed, sleeping, per discussion w/ RN pt is very confused and combative/agitated, pt left to rest, no visitors at bedside. Pt is on puree diet, SLP attempted to evaluate today however unable to assess as pt agitated/combative. RD unable to assess PO PTA and/or wt hx, appears fairly well nourished. NKFA. No apparent GI distress, +fecal incontinence 2/2. RD to follow up per protocol.

## 2020-02-02 NOTE — DIETITIAN INITIAL EVALUATION ADULT. - PROBLEM SELECTOR PLAN 3
- CXR illustrated b/l effusions and possible infiltrate, with consolidation ultimately confirmed on subsequent CT Chest.   - Given recent hospital DC yesterday, will treat this as a hospital acquired pneumonia   - F/y RVP  - C/w Vanc 1g QD and Zosyn   - Patient at this time is on room air with appropriate respiratory status   - F/u daily CXR

## 2020-02-02 NOTE — SWALLOW BEDSIDE ASSESSMENT ADULT - SWALLOW EVAL: DIAGNOSIS
Difficulties assessing pt's swallow function d/t agitation, pt refusal, and combativeness. Unable to make appropriate recommendations based on limitations of assessment. Recommend reassessment when pt is calmer and more willing to participate.

## 2020-02-02 NOTE — SWALLOW BEDSIDE ASSESSMENT ADULT - SLP PERTINENT HISTORY OF CURRENT PROBLEM
Pt with dementia (AAOx3 in Oct 2019, w/ decline), HTN, DM, SSS s/p pacemaker, osteoporosis, glaucoma  and recent hospitalization at [facility] presenting with generalized weakness and lethargy. Patient admitted for severe sepsis secondary to pneumonia, covering for hospital acquired sources.

## 2020-02-02 NOTE — DIETITIAN INITIAL EVALUATION ADULT. - DIET TYPE
Consider remove cstCHO diet, no indication at this time. Texture per SLP recommendation. Consider addition of Glucerna BID (440kcal and 20gms pro/container) if w/in texture restrictions.

## 2020-02-03 DIAGNOSIS — R53.2 FUNCTIONAL QUADRIPLEGIA: ICD-10-CM

## 2020-02-03 DIAGNOSIS — Z71.89 OTHER SPECIFIED COUNSELING: ICD-10-CM

## 2020-02-03 DIAGNOSIS — N39.0 URINARY TRACT INFECTION, SITE NOT SPECIFIED: ICD-10-CM

## 2020-02-03 DIAGNOSIS — Z51.5 ENCOUNTER FOR PALLIATIVE CARE: ICD-10-CM

## 2020-02-03 DIAGNOSIS — R45.1 RESTLESSNESS AND AGITATION: ICD-10-CM

## 2020-02-03 DIAGNOSIS — R62.7 ADULT FAILURE TO THRIVE: ICD-10-CM

## 2020-02-03 DIAGNOSIS — J18.9 PNEUMONIA, UNSPECIFIED ORGANISM: ICD-10-CM

## 2020-02-03 DIAGNOSIS — F03.90 UNSPECIFIED DEMENTIA WITHOUT BEHAVIORAL DISTURBANCE: ICD-10-CM

## 2020-02-03 LAB
GLUCOSE BLDC GLUCOMTR-MCNC: 62 MG/DL — LOW (ref 70–99)
GLUCOSE BLDC GLUCOMTR-MCNC: 78 MG/DL — SIGNIFICANT CHANGE UP (ref 70–99)
GLUCOSE BLDC GLUCOMTR-MCNC: 82 MG/DL — SIGNIFICANT CHANGE UP (ref 70–99)
GLUCOSE BLDC GLUCOMTR-MCNC: 85 MG/DL — SIGNIFICANT CHANGE UP (ref 70–99)
GLUCOSE BLDC GLUCOMTR-MCNC: 96 MG/DL — SIGNIFICANT CHANGE UP (ref 70–99)
GLUCOSE BLDC GLUCOMTR-MCNC: 96 MG/DL — SIGNIFICANT CHANGE UP (ref 70–99)
TSH SERPL-MCNC: 3.32 UIU/ML — SIGNIFICANT CHANGE UP (ref 0.35–4.94)

## 2020-02-03 PROCEDURE — 93010 ELECTROCARDIOGRAM REPORT: CPT

## 2020-02-03 PROCEDURE — 99223 1ST HOSP IP/OBS HIGH 75: CPT

## 2020-02-03 PROCEDURE — 99233 SBSQ HOSP IP/OBS HIGH 50: CPT | Mod: GC

## 2020-02-03 RX ORDER — DEXTROSE 50 % IN WATER 50 %
15 SYRINGE (ML) INTRAVENOUS ONCE
Refills: 0 | Status: COMPLETED | OUTPATIENT
Start: 2020-02-03 | End: 2020-02-03

## 2020-02-03 RX ORDER — POTASSIUM CHLORIDE 20 MEQ
10 PACKET (EA) ORAL
Refills: 0 | Status: DISCONTINUED | OUTPATIENT
Start: 2020-02-03 | End: 2020-02-04

## 2020-02-03 RX ORDER — SODIUM CHLORIDE 9 MG/ML
1000 INJECTION, SOLUTION INTRAVENOUS
Refills: 0 | Status: DISCONTINUED | OUTPATIENT
Start: 2020-02-03 | End: 2020-02-04

## 2020-02-03 RX ORDER — MAGNESIUM SULFATE 500 MG/ML
2 VIAL (ML) INJECTION ONCE
Refills: 0 | Status: COMPLETED | OUTPATIENT
Start: 2020-02-03 | End: 2020-02-04

## 2020-02-03 RX ADMIN — BRIMONIDINE TARTRATE 1 DROP(S): 2 SOLUTION/ DROPS OPHTHALMIC at 14:23

## 2020-02-03 RX ADMIN — LATANOPROST 1 DROP(S): 0.05 SOLUTION/ DROPS OPHTHALMIC; TOPICAL at 22:34

## 2020-02-03 RX ADMIN — ENOXAPARIN SODIUM 65 MILLIGRAM(S): 100 INJECTION SUBCUTANEOUS at 17:22

## 2020-02-03 RX ADMIN — Medication 15 GRAM(S): at 09:35

## 2020-02-03 RX ADMIN — Medication 100 MILLIEQUIVALENT(S): at 23:45

## 2020-02-03 RX ADMIN — ENOXAPARIN SODIUM 65 MILLIGRAM(S): 100 INJECTION SUBCUTANEOUS at 06:55

## 2020-02-03 RX ADMIN — CEFTRIAXONE 1000 MILLIGRAM(S): 500 INJECTION, POWDER, FOR SOLUTION INTRAMUSCULAR; INTRAVENOUS at 09:35

## 2020-02-03 RX ADMIN — BRIMONIDINE TARTRATE 1 DROP(S): 2 SOLUTION/ DROPS OPHTHALMIC at 06:57

## 2020-02-03 RX ADMIN — BRIMONIDINE TARTRATE 1 DROP(S): 2 SOLUTION/ DROPS OPHTHALMIC at 22:34

## 2020-02-03 NOTE — CONSULT NOTE ADULT - PROBLEM SELECTOR RECOMMENDATION 4
- Progressive decline and FTT in the setting of recurrent hospital stays and dementia, now with additional issue of dysphagia.   - Patient continuing S+S evaluation - was too agitated to comply. Per family, patient was able to tolerated pureed diet at home when assisted. Will continue to follow up regarding safe diet for patient.

## 2020-02-03 NOTE — CONSULT NOTE ADULT - PROBLEM SELECTOR RECOMMENDATION 6
Support provided to patient and family. Patient to have access to supportive services during rest of hospital stay as the patient/family deemed necessary ie. Chaplaincy, Massage therapy, Music therapy, Patient and family supportive services, Palliative SW, etc.   As discussed during the palliative IDT meeting and as identified during the patients PSSA screening the patient would benefit from: [email or web address]

## 2020-02-03 NOTE — CHART NOTE - NSCHARTNOTEFT_GEN_A_CORE
Discussed comfort feeds with HCP. Wish is to go forward with comfort feeds. Aware that pt is aspiration risk and not cleared by S&S. Will start with dysphagia diet and monitor closely with aspiration precautions. Discussed comfort feeds with HCP. Wish is to go forward with comfort feeds. Aware that pt is aspiration risk and not cleared by S&S. Will start with dysphagia diet and monitor closely with aspiration precautions. Additionally, requesting deferring blood draws.

## 2020-02-03 NOTE — CONSULT NOTE ADULT - PROBLEM SELECTOR RECOMMENDATION 9
Continue with antibiotic treatment of suspected pneumonia vs biliary infection. At this time, no further fevers, improving leucopenia, and patient appears far better than on admission

## 2020-02-03 NOTE — CONSULT NOTE ADULT - PROBLEM SELECTOR RECOMMENDATION 7
On last hospitalization in January, DNR/DNI had been reversed by son at the time and patient was intubated and in the ICU. Family understands that they would not like this repeated and re-instated DNR/DNI on this hospitalization. Albert Villalpando (son) is primary decision maker in conjunction with her other children. Family discussion to be had regarding next steps in terms of what their hopes for their mother's care is and where they would like her to be. Home Hospice/ Inpatient Hospice would certainly be a consideration given severe dementia.

## 2020-02-03 NOTE — CONSULT NOTE ADULT - PROBLEM SELECTOR RECOMMENDATION 5
- Patient reportedly A&Ox3 in Oct 2019   - At this time, she is unintelligible, not participating in meaningful conversation, suspect this has been patient's baseline for some time.   - Given severe dementia, patient likely eligible for hospice services. Will discuss with family.

## 2020-02-03 NOTE — PROGRESS NOTE ADULT - PROBLEM SELECTOR PLAN 10
1) PCP Contacted on Admission: (Y/N) --> Name & Phone #: Dr. Tere Lackey [phone]  2) Date of Contact with PCP:  3) PCP Contacted at Discharge: (Y/N)  4) Summary of Handoff Given to PCP:   5) Post-Discharge Appointment Date and Location:

## 2020-02-03 NOTE — CONSULT NOTE ADULT - ASSESSMENT
89 y/o female with dementia (AAOx3 in Oct 2019, w/ decline), HTN, DM, SSS s/p pacemaker, Osteoporosis, Glaucoma, Pulmonary Embolism and recent hospitalization at [facility] presenting with generalized weakness and lethargy. Admitted for severe sepsis with source as yet unclear, but being treated for pneumonia and biliary source. She exhibits progressive functional decline and e/o severe dementia in the setting of recurrent hospitalizations.

## 2020-02-03 NOTE — PROGRESS NOTE ADULT - SUBJECTIVE AND OBJECTIVE BOX
INTERVAL HPI/OVERNIGHT EVENTS:  Patient's grand-daughter brought documents from [facility]: patient was treated for HAP, she required intubation, and was successfully extubated and then made DNR/DNI. The grand daughter will attempt to bring in CT results from [place] and will try to get more records since [place].     Patient was seen and examined at bedside. Hypoglycemic in AM, given orange juice with difficulty, and glucose tabs, improved.    Patient agitated at times, speaks at times, in Creole, but minimally oriented. Unable to obtain ROS. She was too combative to have US abdomen 2/3. Goals of care discussion continued today with son, grand-daughter and son.    T(F): 91.8 (02-03-20 @ 16:30)  HR: 67 (02-03-20 @ 16:30)  BP: 177/77 (02-03-20 @ 16:30)  RR: 20 (02-03-20 @ 16:30)  SpO2: 100% (02-03-20 @ 16:30)    PHYSICAL EXAM:    Constitutional: female, elderly, frail, , agitated, anasarca   HEENT: arcus senilis, no JVD, MMM, edentulous  Respiratory: CTA b/l, however limited exam due to poor patient cooperation  Cardiovascular: RRR, normal S1S2, no M/R/G  Gastrointestinal: soft, patient combative during exam  Extremities: Warm, well perfused, non-pitting edema, no clubbing  Neurological: AAOx0, unable to assess CN,  Skin: normal temperature, cool, dry    MEDICATIONS  (STANDING):  brimonidine 0.2% Ophthalmic Solution 1 Drop(s) Both EYES three times a day  dextrose 5%. 1000 milliLiter(s) (50 mL/Hr) IV Continuous <Continuous>  dextrose 50% Injectable 12.5 Gram(s) IV Push once  dextrose 50% Injectable 25 Gram(s) IV Push once  dextrose 50% Injectable 25 Gram(s) IV Push once  enoxaparin Injectable 65 milliGRAM(s) SubCutaneous every 12 hours  folic acid Injectable 1 milliGRAM(s) IV Push daily  insulin lispro (HumaLOG) corrective regimen sliding scale   SubCutaneous Before meals and at bedtime  latanoprost 0.005% Ophthalmic Solution 1 Drop(s) Both EYES at bedtime  levoFLOXacin  Tablet 750 milliGRAM(s) Oral every 48 hours    MEDICATIONS  (PRN):  acetaminophen    Suspension .. 650 milliGRAM(s) Oral every 6 hours PRN Mild Pain (1 - 3)  dextrose 40% Gel 15 Gram(s) Oral once PRN Blood Glucose LESS THAN 70 milliGRAM(s)/deciliter  glucagon  Injectable 1 milliGRAM(s) IntraMuscular once PRN Glucose LESS THAN 70 milligrams/deciliter      Allergies    No Known Allergies    Intolerances      LABS:                         8.2    3.34  )-----------( 77       ( 02 Feb 2020 06:52 )             24.1     02-02    142  |  110<H>  |  18  ----------------------------<  83  3.4<L>   |  21<L>  |  1.11    Ca    8.5      02 Feb 2020 06:52  Phos  3.0     02-02  Mg     1.6     02-02                    RADIOLOGY, EKG & ADDITIONAL TESTS: Reviewed.

## 2020-02-03 NOTE — PROGRESS NOTE ADULT - SUBJECTIVE AND OBJECTIVE BOX
Pt seen and examined by me at bedside this AM with HS  pt contracted,   VSS  limited exam 2/2 agitation if moved   comfortable   answered some simple question in English "yes" and "no"  relative clear to ausculation of L lung fields  +anasarca  +bs/nt/nd  +nonpitting edema of LEs   no labs today    a/p:  1. ?PNA  2. Severe Dementia   3. Functional quadraplegia  4. Oropharyngeal dysphagia  5. Recent dx of PE  6. Panctopenia    -clinical course reviewed, ok with levaquin for now, no need for escalation for now,   -LFT elevated, clinically no abd pain on exam, pending RUE u/s  -appreciate palliative consult, failed S&S, f/u family plan for comfort feeds  -on lovenox 2/2 failed S&S  plan d/w housesta

## 2020-02-03 NOTE — CONSULT NOTE ADULT - SUBJECTIVE AND OBJECTIVE BOX
SAHRA BARNES   MRN-[id number]     : 1924    HPI:  89 y/o female with dementia (AAOx3 in Oct 2019, w/ decline), HTN, DM, SSS s/p pacemaker, Osteoporosis, Glaucoma, Pulmonary Embolism and recent hospitalization at [facility] presenting with generalized weakness and lethargy. Patient hospitalized at [place] with initial parotitis and MSSA bacteremia. Discharged home but presented to [facility] on 1/15/2020 with poor PO intake and generalized weakness since discharge. Admitted to ICU after being intubated for hypoxic respiratory failure and was treated with vancomycin and cefepime for HAP. She was also found to have a new left segmental PE pulmonary embolism, treated with enoxaparin and then switched to rivaroxaban. Patient initially w/ PICC line placed but MSSA bacteremia deemed by [facility] ID team to be contaminant and PICC line removed. Patient was discharged home yesterday, and given Xarelto for pulmonary embolism as per discharge instructions however not at the pharmacy when she went to pick it up today.     Patient's granddaughter reports she is surprised her grandmother was discharged yesterday and went to help her with the 24 hour aid today and patient was slumped over in wheelchair, but denies any head injury or LOC. Patient's granddaughter reports she feels that patient was not ready to be discharged and since discharge is persistently weak. Patient unable to verbalize complaints but, per family, patient with no fever, no cough, sore throat SOB, or pain since discharge. Reports at baseline she was AAOx3 and very active until around 2019; since then she has had multiple hospitalizations and has persistently remained lethargic.     In the ED, vitals as follows: Tmin: 93.2F | HR: 69-76bpm | BP: /53-64mmHg | RR: 16/min | SpO2: % RA    Labs significant for: WBC 2.49, Hgb 9.2 (Elev RDW), Plt 89, Lactate 2.2 -> 1.2, Albumin 2.4, Alk Phos 125, AST 41, ALT 17, Trop 0.22 -> 0.2, BNP > 5,000, Blood Gas wnl, UA [SG 1.025, Hyaline and Granular casts]   Imaging:  CTH  No hydrocephalus, midline shift, acute intracranial hemorrhage or demarcated territorial infarct.    CT Chest  Moderate-sized bilateral pleural effusions with overlying consolidation. Cannot exclude underlying bibasilar infiltrates. Correlate clinically.  Cholelithiasis. Punctate calcifications within the region of the distal cystic/common hepatic duct suspicious for cortical cholelithiasis. Possible mild stranding of the fat surrounding the gallbladder may represent active inflammatory change. Correlate whether the patient is experiencing any tenderness within the right upper quadrant.    Patient was administered: Vanc/Zosyn, NS 500cc (2020 20:54)    PAST MEDICAL & SURGICAL HISTORY:  H/O sick sinus syndrome  Pulmonary embolus  Hypertension  Diabetes mellitus    FAMILY HISTORY:    ROS:                Dyspnea (Paul 0-10):                        N/V (Y/N):                              Secretions (Y/N) :                Agitation(Y/N):   Pain (Y/N):       -Provocation/Palliation:  -Quality/Quantity:  -Radiating:  -Severity:  -Timing/Frequency:  -Impact on ADLs:    General:  Denied  HEENT:    Denied  Neck:  Denied  CVS:  Denied  Resp:  Denied  GI:  Denied  :  Denied  Musc:  Denied  Neuro:  Denied  Psych:  Denied  Skin:  Denied  Lymph:  Denied    Allergies    No Known Allergies    Intolerances    Opiate Naive (Y/N): Yes  -iStop reviewed (Y/N): Y I Ref: [id number]    MEDICATIONS:      MEDICATIONS  (STANDING):  brimonidine 0.2% Ophthalmic Solution 1 Drop(s) Both EYES three times a day  dextrose 5%. 1000 milliLiter(s) (50 mL/Hr) IV Continuous <Continuous>  dextrose 50% Injectable 12.5 Gram(s) IV Push once  dextrose 50% Injectable 25 Gram(s) IV Push once  dextrose 50% Injectable 25 Gram(s) IV Push once  enoxaparin Injectable 65 milliGRAM(s) SubCutaneous every 12 hours  folic acid Injectable 1 milliGRAM(s) IV Push daily  insulin lispro (HumaLOG) corrective regimen sliding scale   SubCutaneous Before meals and at bedtime  latanoprost 0.005% Ophthalmic Solution 1 Drop(s) Both EYES at bedtime  levoFLOXacin  Tablet 750 milliGRAM(s) Oral every 48 hours    MEDICATIONS  (PRN):  acetaminophen    Suspension .. 650 milliGRAM(s) Oral every 6 hours PRN Mild Pain (1 - 3)  dextrose 40% Gel 15 Gram(s) Oral once PRN Blood Glucose LESS THAN 70 milliGRAM(s)/deciliter  glucagon  Injectable 1 milliGRAM(s) IntraMuscular once PRN Glucose LESS THAN 70 milligrams/deciliter  haloperidol    Injectable 1 milliGRAM(s) IntraMuscular three times a day PRN agitation    LABS:    CBC:                        8.2    3.34  )-----------( 77       ( 2020 06:52 )             24.1     CMP:        142  |  110<H>  |  18  ----------------------------<  83  3.4<L>   |  21<L>  |  1.11    Ca    8.5      2020 06:52  Phos  3.0       Mg     1.6         Urinalysis Basic - ( 2020 15:37 )    Color: Yellow / Appearance: Cloudy / S.025 / pH: x  Gluc: x / Ketone: NEGATIVE  / Bili: Negative / Urobili: 0.2 E.U./dL   Blood: x / Protein: Trace mg/dL / Nitrite: POSITIVE   Leuk Esterase: NEGATIVE / RBC: 5-10 /HPF / WBC < 5 /HPF   Sq Epi: x / Non Sq Epi: Moderate /HPF / Bacteria: Present /HPF    Culture - Blood (20 @ 20:59)    Specimen Source: .Blood Blood    Culture Results:   No growth at 1 day.    Culture - Urine (20 @ 18:46)    -  Aztreonam: S <=4    -  Cefepime: S <=2    -  Ciprofloxacin: S <=0.5    -  Gentamicin: S 4    -  Piperacillin/Tazobactam: S <=8    -  Tobramycin: S <=2    Specimen Source: .Urine Clean Catch (Midstream)    Culture Results:   80,000 CFU/ml Pseudomonas aeruginosa    Organism Identification: Pseudomonas aeruginosa    Organism: Pseudomonas aeruginosa    Method Type: HARDIK    IMAGING:     EXAM:  CT CHEST                          PROCEDURE DATE:  2020      INTERPRETATION:  CT SCAN OF CHEST    History: Evaluate for pneumonia    Technique: CT scan of chest performed from lung apices through lung bases. Axial, coronal, and sagittal multiplanar reformatted images were produced. Thin section axial images and axial MIPS were also produced. Intravenous contrast material was not administered, as ordered.    Comparison: None.    Impression:   Moderate-sized bilateral pleural effusions with overlying consolidation. Cannot exclude underlying bibasilar infiltrates. Correlate clinically.    Cholelithiasis. Punctate calcifications within the region of the distalcystic/common hepatic duct suspicious for cortical cholelithiasis. Possible mild stranding of the fat surrounding the gallbladder may represent active inflammatory change. Correlate whether the patient is experiencing any tenderness within the right upper quadrant.    EXAM:  CT BRAIN                          PROCEDURE DATE:  2020      INTERPRETATION:  CT OF THE HEAD WITHOUT CONTRAST    INDICATION:  AMS  AMS    TECHNIQUE: An axial noncontrast CT scan of the head was performed. Sagittal and coronal reformatted images were also obtained.    CONTRAST:  None    COMPARISON:  None    FINDINGS:  There is ventricular and sulcal prominence consistent with generalized age related cerebral volume loss. There is no hydrocephalus. The basal cisterns are patent. There is no focal mass effect or midline shift. There are no extra-axial collections or intraparenchymal hemorrhage.    There are scattered regions of hypodensity involving the periventricular and subcortical white matter consistent with chronic microvascular ischemic changes. Chronic lacunar infarct within the left thalamus, anterior limb of the right internal capsule, bilateral external capsule, right caudate and posterior left cerebellum There is no evidence of acute transcortical territorial infarction.    Empty CSF filled sella turcica.    The patient has had prior bilateral ocular lens replacement. The mastoids and paranasal sinuses are well aerated. The calvaria is intact.    IMPRESSION:  No hydrocephalus, midline shift, acute intracranial hemorrhage or demarcated territorial infarct.    PHYSICAL EXAM:  T(C): 36.4 (20 @ 08:39), Max: 36.6 (20 @ 05:34)  HR: 73 (20 @ 08:39) (70 - 74)  BP: 161/82 (20 @ 08:39) (130/80 - 161/82)  RR: 18 (20 @ 08:39) (17 - 18)  SpO2: 96% (20 @ 08:39) (95% - 97%)  Wt(kg): --66    POCT Blood Glucose.: 96 mg/dL (2020 12:13)    I&O's Summary      General:   HEENT:    Neck:   CVS:   Resp:   GI:    :    Musc:     Neuro:   Psych:     Skin:  Lymph:  Preadmit Karnofsky:  %           Current Karnofsky:     %  Cachexia (Y/N):   BMI: 28.4    ADVANCED DIRECTIVES:   DNR/DNI  MOLST       Decision maker: pt has no capacity to make complex medical decisions   Legal surrogate: Citlaly Bassem : [phone]    GOALS OF CARE DISCUSSION:       Palliative care info/counseling provided	           Family meeting       Advanced Directives addressed please see Advance Care Planning Note	           See previous Palliative Medicine Note       Documentation of GOC: 	          REFERRALS:	        Palliative Med        Unit SW/Case Mgmt              Speech/Swallow       Patient/Family Support       Massage Therapy       Music Therapy       Hospice       Nutrition       Ethics       PT/OT SAHRA VILLALPANDO   MRN-[id number]     : 1924    HPI:  89 y/o female with dementia (AAOx3 in Oct 2019, w/ decline), HTN, DM, SSS s/p pacemaker, Osteoporosis, Glaucoma, Pulmonary Embolism and recent hospitalization at [facility] presenting with generalized weakness and lethargy. Patient hospitalized at [facility] with initial parotitis and MSSA bacteremia. Discharged home but presented to [facility] on 1/15/2020 with poor PO intake and generalized weakness since discharge. Admitted to ICU after being intubated for hypoxic respiratory failure and was treated with vancomycin and cefepime for HAP. She was also found to have a new left segmental PE pulmonary embolism, treated with enoxaparin and then switched to rivaroxaban. Patient initially w/ PICC line placed but MSSA bacteremia deemed by [facility] ID team to be contaminant and PICC line removed. Patient was discharged home yesterday, and given Xarelto for pulmonary embolism as per discharge instructions however not at the pharmacy when she went to pick it up today.     Patient's granddaughter reports she is surprised her grandmother was discharged yesterday and went to help her with the 24 hour aid today and patient was slumped over in wheelchair, but denies any head injury or LOC. Patient unable to verbalize complaints but, per family, patient with no fever, no cough, sore throat SOB, or pain since discharge. Reports at baseline she was AAOx3 and very active until around 2019; since then she has had multiple hospitalizations and has persistently remained lethargic.     Palliative Care consulted for ongoing GOC and disposition management in patient with e/o severe dementia and poor functional status with progressive decline.     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 Family not at bedside to provide further collateral regarding her social history, but this will be obtain in due course.     ROS:    Unable to attain due to: Patient unable to verbalize intelligibly.                     Dyspnea (Paul 0-10): 0                       N/V (Y/N): N                              Secretions (Y/N): N                Agitation(Y/N): Y - patient becomes intermittently agitated with moving, blood draws, physical exams - has received IM Haldol 1mg x 1   Pain (Y/N): N        General: Unable to attain   HEENT: Unable to attain   Neck:  Unable to attain   CVS:  Unable to attain   Resp:  Unable to attain   GI: Unable to attain   : Unable to attain   Musc: Unable to attain   Neuro: Unable to attain   Psych: Unable to attain   Skin: Unable to attain   Lymph: Unable to attain     Allergies  No Known Allergies  Intolerances    Opiate Naive (Y/N): Yes  -iStop reviewed (Y/N): Y I Ref: [id number]    MEDICATIONS:      MEDICATIONS  (STANDING):  brimonidine 0.2% Ophthalmic Solution 1 Drop(s) Both EYES three times a day  dextrose 5%. 1000 milliLiter(s) (50 mL/Hr) IV Continuous <Continuous>  dextrose 50% Injectable 12.5 Gram(s) IV Push once  dextrose 50% Injectable 25 Gram(s) IV Push once  dextrose 50% Injectable 25 Gram(s) IV Push once  enoxaparin Injectable 65 milliGRAM(s) SubCutaneous every 12 hours  folic acid Injectable 1 milliGRAM(s) IV Push daily  insulin lispro (HumaLOG) corrective regimen sliding scale   SubCutaneous Before meals and at bedtime  latanoprost 0.005% Ophthalmic Solution 1 Drop(s) Both EYES at bedtime  levoFLOXacin  Tablet 750 milliGRAM(s) Oral every 48 hours    MEDICATIONS  (PRN):  acetaminophen    Suspension .. 650 milliGRAM(s) Oral every 6 hours PRN Mild Pain (1 - 3)  dextrose 40% Gel 15 Gram(s) Oral once PRN Blood Glucose LESS THAN 70 milliGRAM(s)/deciliter  glucagon  Injectable 1 milliGRAM(s) IntraMuscular once PRN Glucose LESS THAN 70 milligrams/deciliter  haloperidol    Injectable 1 milliGRAM(s) IntraMuscular three times a day PRN agitation    LABS:    CBC:                        8.2    3.34  )-----------( 77       ( 2020 06:52 )             24.1     CMP:        142  |  110<H>  |  18  ----------------------------<  83  3.4<L>   |  21<L>  |  1.11    Ca    8.5      2020 06:52  Phos  3.0       Mg     1.6         Urinalysis Basic - ( 2020 15:37 )    Color: Yellow / Appearance: Cloudy / S.025 / pH: x  Gluc: x / Ketone: NEGATIVE  / Bili: Negative / Urobili: 0.2 E.U./dL   Blood: x / Protein: Trace mg/dL / Nitrite: POSITIVE   Leuk Esterase: NEGATIVE / RBC: 5-10 /HPF / WBC < 5 /HPF   Sq Epi: x / Non Sq Epi: Moderate /HPF / Bacteria: Present /HPF    Culture - Blood (20 @ 20:59)    Specimen Source: .Blood Blood    Culture Results:   No growth at 1 day.    Culture - Urine (20 @ 18:46)    -  Aztreonam: S <=4    -  Cefepime: S <=2    -  Ciprofloxacin: S <=0.5    -  Gentamicin: S 4    -  Piperacillin/Tazobactam: S <=8    -  Tobramycin: S <=2    Specimen Source: .Urine Clean Catch (Midstream)    Culture Results:   80,000 CFU/ml Pseudomonas aeruginosa    Organism Identification: Pseudomonas aeruginosa    Organism: Pseudomonas aeruginosa    Method Type: HARDIK    IMAGING:   EXAM:  CT CHEST                        PROCEDURE DATE:  2020    History: Evaluate for pneumonia  Impression:   Moderate-sized bilateral pleural effusions with overlying consolidation. Cannot exclude underlying bibasilar infiltrates. Correlate clinically.  Cholelithiasis. Punctate calcifications within the region of the distalcystic/common hepatic duct suspicious for cortical cholelithiasis. Possible mild stranding of the fat surrounding the gallbladder may represent active inflammatory change. Correlate whether the patient is experiencing any tenderness within the right upper quadrant.    EXAM:  CT BRAIN                        PROCEDURE DATE:  2020    INDICATION:  AMS  AMS  FINDINGS:  No hydrocephalus, midline shift, acute intracranial hemorrhage or demarcated territorial infarct.    PEx:  T(C): 36.4 (20 @ 08:39), Max: 36.6 (20 @ 05:34)  HR: 73 (20 @ 08:39) (70 - 74)  BP: 161/82 (20 @ 08:39) (130/80 - 161/82)  RR: 18 (20 @ 08:39) (17 - 18)  SpO2: 96% (20 @ 08:39) (95% - 97%)  POCT Blood Glucose.: 96 mg/dL (2020 12:13)    Constitutional: Elderly AA female, lying in bed, awake and alert - smiling and saying hello, but no intelligible conversation.   HEENT: PERRLA, MM dry  Neck: No LAD, No JVD  Back: Normal spine flexure, No CVA tenderness  Respiratory: On RA, normal inspiratory effort. Coarse crackles at both lung bases.   Cardiovascular: S1 and S2 present - no additional abnormal sounds or murmurs. PPM in place.   Gastrointestinal: Resisting physical examination but seemingly NT, ND, BS+  Extremities: 1-2+ edema in the b/l UE and LE.   Vascular: 2+ peripheral pulses  Neurological: A/O x 0. No obvious CN deficit. Not participating in strength exam, but moving all 4 limbs. Normal tone.  Preadmit Karnofsky: 20%     Current Karnofsky: 20%  Cachexia (Y/N): N  BMI: 28.4    Advanced Directives:     DNR/DNI      [facility]    Decision maker: Albert Villalpando [phone]  Legal surrogate: Albert Villalpando [phone] ( son)  other local contacts: Citlaly Luevano: (granddaughter): [phone]    GOALS OF CARE DISCUSSION       Palliative care info/counseling provided           Advanced Directives addressed please see Advance Care Planning Note           Documentation of GOC:           REFERRALS        Palliative Med        Unit SW/Case Mgmt              Speech/Swallow and Dietician (Active Consults)       Hospice SAHRA VILLALPANDO   MRN-[id number]     : 1924    HPI:  89 y/o female with dementia (AAOx3 in Oct 2019, w/ decline), HTN, DM, SSS s/p pacemaker, Osteoporosis, Glaucoma, Pulmonary Embolism and recent hospitalization at [facility] presenting with generalized weakness and lethargy. Patient hospitalized at [place] with initial parotitis and MSSA bacteremia. Discharged home but presented to [facility] on 1/15/2020 with poor PO intake and generalized weakness since discharge. Admitted to ICU after being intubated for hypoxic respiratory failure and was treated with vancomycin and cefepime for HAP. She was also found to have a new left segmental PE pulmonary embolism, treated with enoxaparin and then switched to rivaroxaban. Patient initially w/ PICC line placed but MSSA bacteremia deemed by [facility] ID team to be contaminant and PICC line removed. Patient was discharged home yesterday, and given Xarelto for pulmonary embolism as per discharge instructions however not at the pharmacy when she went to pick it up today.     Patient's granddaughter reports she is surprised her grandmother was discharged yesterday and went to help her with the 24 hour aid today and patient was slumped over in wheelchair, but denies any head injury or LOC. Patient unable to verbalize complaints but, per family, patient with no fever, no cough, sore throat SOB, or pain since discharge. Reports at baseline she was AAOx3 and very active until around 2019; since then she has had multiple hospitalizations and has persistently remained lethargic.     Palliative Care consulted for ongoing GOC and disposition management in patient with e/o severe dementia and poor functional status with progressive decline.     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 Family not at bedside to provide further collateral regarding her social history, but this will be obtain in due course.     ROS:    Unable to attain due to: Patient unable to verbalize intelligibly.                     Dyspnea (Paul 0-10): 0                       N/V (Y/N): N                              Secretions (Y/N): N                Agitation(Y/N): Y - patient becomes intermittently agitated with moving, blood draws, physical exams - has received IM Haldol 1mg x 1   Pain (Y/N): N        General: Unable to attain   HEENT: Unable to attain   Neck:  Unable to attain   CVS:  Unable to attain   Resp:  Unable to attain   GI: Unable to attain   : Unable to attain   Musc: Unable to attain   Neuro: Unable to attain   Psych: Unable to attain   Skin: Unable to attain   Lymph: Unable to attain     Allergies  No Known Allergies  Intolerances    Opiate Naive (Y/N): Yes  -iStop reviewed (Y/N): Y I Ref: [id number]    MEDICATIONS:      MEDICATIONS  (STANDING):  brimonidine 0.2% Ophthalmic Solution 1 Drop(s) Both EYES three times a day  dextrose 5%. 1000 milliLiter(s) (50 mL/Hr) IV Continuous <Continuous>  dextrose 50% Injectable 12.5 Gram(s) IV Push once  dextrose 50% Injectable 25 Gram(s) IV Push once  dextrose 50% Injectable 25 Gram(s) IV Push once  enoxaparin Injectable 65 milliGRAM(s) SubCutaneous every 12 hours  folic acid Injectable 1 milliGRAM(s) IV Push daily  insulin lispro (HumaLOG) corrective regimen sliding scale   SubCutaneous Before meals and at bedtime  latanoprost 0.005% Ophthalmic Solution 1 Drop(s) Both EYES at bedtime  levoFLOXacin  Tablet 750 milliGRAM(s) Oral every 48 hours    MEDICATIONS  (PRN):  acetaminophen    Suspension .. 650 milliGRAM(s) Oral every 6 hours PRN Mild Pain (1 - 3)  dextrose 40% Gel 15 Gram(s) Oral once PRN Blood Glucose LESS THAN 70 milliGRAM(s)/deciliter  glucagon  Injectable 1 milliGRAM(s) IntraMuscular once PRN Glucose LESS THAN 70 milligrams/deciliter  haloperidol    Injectable 1 milliGRAM(s) IntraMuscular three times a day PRN agitation    LABS:    CBC:                        8.2    3.34  )-----------( 77       ( 2020 06:52 )             24.1     CMP:        142  |  110<H>  |  18  ----------------------------<  83  3.4<L>   |  21<L>  |  1.11    Ca    8.5      2020 06:52  Phos  3.0       Mg     1.6         Urinalysis Basic - ( 2020 15:37 )    Color: Yellow / Appearance: Cloudy / S.025 / pH: x  Gluc: x / Ketone: NEGATIVE  / Bili: Negative / Urobili: 0.2 E.U./dL   Blood: x / Protein: Trace mg/dL / Nitrite: POSITIVE   Leuk Esterase: NEGATIVE / RBC: 5-10 /HPF / WBC < 5 /HPF   Sq Epi: x / Non Sq Epi: Moderate /HPF / Bacteria: Present /HPF    Culture - Blood (20 @ 20:59)    Specimen Source: .Blood Blood    Culture Results:   No growth at 1 day.    Culture - Urine (20 @ 18:46)    -  Aztreonam: S <=4    -  Cefepime: S <=2    -  Ciprofloxacin: S <=0.5    -  Gentamicin: S 4    -  Piperacillin/Tazobactam: S <=8    -  Tobramycin: S <=2    Specimen Source: .Urine Clean Catch (Midstream)    Culture Results:   80,000 CFU/ml Pseudomonas aeruginosa    Organism Identification: Pseudomonas aeruginosa    Organism: Pseudomonas aeruginosa    Method Type: HARDIK    IMAGING:   EXAM:  CT CHEST                        PROCEDURE DATE:  2020    History: Evaluate for pneumonia  Impression:   Moderate-sized bilateral pleural effusions with overlying consolidation. Cannot exclude underlying bibasilar infiltrates. Correlate clinically.  Cholelithiasis. Punctate calcifications within the region of the distalcystic/common hepatic duct suspicious for cortical cholelithiasis. Possible mild stranding of the fat surrounding the gallbladder may represent active inflammatory change. Correlate whether the patient is experiencing any tenderness within the right upper quadrant.    EXAM:  CT BRAIN                        PROCEDURE DATE:  2020    INDICATION:  AMS  AMS  FINDINGS:  No hydrocephalus, midline shift, acute intracranial hemorrhage or demarcated territorial infarct.    PEx:  T(C): 36.4 (20 @ 08:39), Max: 36.6 (20 @ 05:34)  HR: 73 (20 @ 08:39) (70 - 74)  BP: 161/82 (20 @ 08:39) (130/80 - 161/82)  RR: 18 (20 @ 08:39) (17 - 18)  SpO2: 96% (20 @ 08:39) (95% - 97%)  POCT Blood Glucose.: 96 mg/dL (2020 12:13)    Constitutional: Elderly AA female, lying in bed, awake and alert - smiling and saying hello, but no intelligible conversation.   HEENT: PERRLA, MM dry  Neck: No LAD, No JVD  Back: Normal spine flexure, No CVA tenderness  Respiratory: On RA, normal inspiratory effort. Coarse crackles at both lung bases.   Cardiovascular: S1 and S2 present - no additional abnormal sounds or murmurs. PPM in place.   Gastrointestinal: Resisting physical examination but seemingly NT, ND, BS+  Extremities: 1-2+ edema in the b/l UE and LE.   Vascular: 2+ peripheral pulses  Neurological: A/O x 0. No obvious CN deficit. Not participating in strength exam, but moving all 4 limbs. Normal tone.  Preadmit Karnofsky: 20%     Current Karnofsky: 20%  Cachexia (Y/N): N  BMI: 28.4    Advanced Directives:     DNR/DNI      [facility]    Decision maker: Albert Villalpando [phone]  Legal surrogate: Albert Villalpando [phone] ( son)  other local contacts: Citlaly Luevano: (granddaughter): [phone]    GOALS OF CARE DISCUSSION       Palliative care info/counseling provided           Advanced Directives addressed please see Advance Care Planning Note           Documentation of GOC: for family meeting  @ 12 noon to discuss GOC          REFERRALS        Palliative Med        Unit SW/Case Mgmt              Speech/Swallow and Dietician (Active Consults)       Hospice

## 2020-02-03 NOTE — CONSULT NOTE ADULT - PROBLEM SELECTOR RECOMMENDATION 2
- For agitation, patient has received IM Haldol 1mg x 1 with a PRN order in place.   - She gets agitated with blood draws, physical exams, etc   - Would remove IM option as this is uncomfortable for patient.   - If possible, restore IV access and would continue IV Haldol 0.5mg q 12 PRN for agitation.  -consider Haldol 0.5 mg IV q 8 hr prn

## 2020-02-04 LAB
ANION GAP SERPL CALC-SCNC: 11 MMOL/L — SIGNIFICANT CHANGE UP (ref 5–17)
BUN SERPL-MCNC: 17 MG/DL — SIGNIFICANT CHANGE UP (ref 7–23)
CALCIUM SERPL-MCNC: 8.2 MG/DL — LOW (ref 8.4–10.5)
CHLORIDE SERPL-SCNC: 112 MMOL/L — HIGH (ref 96–108)
CO2 SERPL-SCNC: 20 MMOL/L — LOW (ref 22–31)
CORTIS AM PEAK SERPL-MCNC: 16.2 UG/DL — SIGNIFICANT CHANGE UP (ref 3.9–37.5)
CREAT SERPL-MCNC: 1.04 MG/DL — SIGNIFICANT CHANGE UP (ref 0.5–1.3)
GLUCOSE BLDC GLUCOMTR-MCNC: 109 MG/DL — HIGH (ref 70–99)
GLUCOSE BLDC GLUCOMTR-MCNC: 116 MG/DL — HIGH (ref 70–99)
GLUCOSE BLDC GLUCOMTR-MCNC: 61 MG/DL — LOW (ref 70–99)
GLUCOSE BLDC GLUCOMTR-MCNC: 91 MG/DL — SIGNIFICANT CHANGE UP (ref 70–99)
GLUCOSE SERPL-MCNC: 55 MG/DL — LOW (ref 70–99)
HCT VFR BLD CALC: 21.8 % — LOW (ref 34.5–45)
HGB BLD-MCNC: 7.3 G/DL — LOW (ref 11.5–15.5)
LACTATE SERPL-SCNC: 1.8 MMOL/L — SIGNIFICANT CHANGE UP (ref 0.5–2)
MAGNESIUM SERPL-MCNC: 2 MG/DL — SIGNIFICANT CHANGE UP (ref 1.6–2.6)
MCHC RBC-ENTMCNC: 32.4 PG — SIGNIFICANT CHANGE UP (ref 27–34)
MCHC RBC-ENTMCNC: 33.5 GM/DL — SIGNIFICANT CHANGE UP (ref 32–36)
MCV RBC AUTO: 96.9 FL — SIGNIFICANT CHANGE UP (ref 80–100)
NRBC # BLD: 5 /100 WBCS — HIGH (ref 0–0)
PHOSPHATE SERPL-MCNC: 2.8 MG/DL — SIGNIFICANT CHANGE UP (ref 2.5–4.5)
PLATELET # BLD AUTO: 65 K/UL — LOW (ref 150–400)
POTASSIUM SERPL-MCNC: 3.7 MMOL/L — SIGNIFICANT CHANGE UP (ref 3.5–5.3)
POTASSIUM SERPL-SCNC: 3.7 MMOL/L — SIGNIFICANT CHANGE UP (ref 3.5–5.3)
RBC # BLD: 2.25 M/UL — LOW (ref 3.8–5.2)
RBC # FLD: 20.1 % — HIGH (ref 10.3–14.5)
SODIUM SERPL-SCNC: 143 MMOL/L — SIGNIFICANT CHANGE UP (ref 135–145)
TSH SERPL-MCNC: 3.5 UIU/ML — SIGNIFICANT CHANGE UP (ref 0.35–4.94)
WBC # BLD: 2.13 K/UL — LOW (ref 3.8–10.5)
WBC # FLD AUTO: 2.13 K/UL — LOW (ref 3.8–10.5)

## 2020-02-04 PROCEDURE — 99233 SBSQ HOSP IP/OBS HIGH 50: CPT

## 2020-02-04 PROCEDURE — 99498 ADVNCD CARE PLAN ADDL 30 MIN: CPT | Mod: 25

## 2020-02-04 PROCEDURE — 99233 SBSQ HOSP IP/OBS HIGH 50: CPT | Mod: GC

## 2020-02-04 PROCEDURE — 93306 TTE W/DOPPLER COMPLETE: CPT | Mod: 26

## 2020-02-04 PROCEDURE — 99497 ADVNCD CARE PLAN 30 MIN: CPT | Mod: 25

## 2020-02-04 RX ORDER — POTASSIUM CHLORIDE 20 MEQ
10 PACKET (EA) ORAL
Refills: 0 | Status: COMPLETED | OUTPATIENT
Start: 2020-02-04 | End: 2020-02-04

## 2020-02-04 RX ORDER — SODIUM CHLORIDE 9 MG/ML
1000 INJECTION, SOLUTION INTRAVENOUS
Refills: 0 | Status: DISCONTINUED | OUTPATIENT
Start: 2020-02-04 | End: 2020-02-05

## 2020-02-04 RX ORDER — DEXTROSE 50 % IN WATER 50 %
50 SYRINGE (ML) INTRAVENOUS ONCE
Refills: 0 | Status: COMPLETED | OUTPATIENT
Start: 2020-02-04 | End: 2020-02-04

## 2020-02-04 RX ADMIN — Medication 100 MILLIEQUIVALENT(S): at 14:01

## 2020-02-04 RX ADMIN — ENOXAPARIN SODIUM 65 MILLIGRAM(S): 100 INJECTION SUBCUTANEOUS at 05:16

## 2020-02-04 RX ADMIN — Medication 50 GRAM(S): at 04:28

## 2020-02-04 RX ADMIN — BRIMONIDINE TARTRATE 1 DROP(S): 2 SOLUTION/ DROPS OPHTHALMIC at 05:16

## 2020-02-04 RX ADMIN — BRIMONIDINE TARTRATE 1 DROP(S): 2 SOLUTION/ DROPS OPHTHALMIC at 14:01

## 2020-02-04 RX ADMIN — Medication 1 MILLIGRAM(S): at 14:55

## 2020-02-04 RX ADMIN — Medication 100 MILLIEQUIVALENT(S): at 16:14

## 2020-02-04 RX ADMIN — SODIUM CHLORIDE 70 MILLILITER(S): 9 INJECTION, SOLUTION INTRAVENOUS at 09:23

## 2020-02-04 RX ADMIN — ENOXAPARIN SODIUM 65 MILLIGRAM(S): 100 INJECTION SUBCUTANEOUS at 19:35

## 2020-02-04 RX ADMIN — BRIMONIDINE TARTRATE 1 DROP(S): 2 SOLUTION/ DROPS OPHTHALMIC at 22:00

## 2020-02-04 RX ADMIN — Medication 50 MILLILITER(S): at 09:23

## 2020-02-04 RX ADMIN — SODIUM CHLORIDE 70 MILLILITER(S): 9 INJECTION, SOLUTION INTRAVENOUS at 01:22

## 2020-02-04 RX ADMIN — Medication 100 MILLIEQUIVALENT(S): at 15:12

## 2020-02-04 RX ADMIN — Medication 100 MILLIEQUIVALENT(S): at 01:01

## 2020-02-04 RX ADMIN — LATANOPROST 1 DROP(S): 0.05 SOLUTION/ DROPS OPHTHALMIC; TOPICAL at 22:00

## 2020-02-04 NOTE — GOALS OF CARE CONVERSATION - ADVANCED CARE PLANNING - CONVERSATION DETAILS
Met with pt son Albert 3:10P-4:00P to discuss GOC. Discussed pt recent cognitive and functional decline over the past 3-4 months, discussed recent hospitalization at [facility], spoke of family hopes and wishes for patient in the near future Son states his wishes for DNR/DNI status. He states that pt was intubated emergently for 3 days while at [facility] before making decision to remove support. Pt was able to breath on her own, but son states he would never "do that to my mother again" He feels that pt is suffering and wantsHe wishes for comfort feeds with strict aspiration precautions  to allow for her to be home, maintain her dignity and not to suffer. He asks that no further blood draws or radiology be done. He wishes for abt to continue until D/C from hospital. Discussed options for HH services to meet family goals. SW aware and will make referral

## 2020-02-04 NOTE — PROGRESS NOTE ADULT - SUBJECTIVE AND OBJECTIVE BOX
Pt seen and examined by me at bedside earlier in AM  overnight events noted    VSS   +agitated limited exam   CTA anterior   +bs/nt/nd  in restraints  labs reviewed     a/p:  1. Severe Dementia  2. Functional quadraplegia  3. Pancytopenia  4. UTI  5. hx of recent PE    -appreciate palliative f/u recs; comfort care, comfort feeds, pending home hospice setup  -no lab draw/no FS check, dc bear hugger,   -ok to c/w levaquin; switch lovenox to xalreto  [facility]

## 2020-02-04 NOTE — PROGRESS NOTE ADULT - SUBJECTIVE AND OBJECTIVE BOX
SAHRA VILLALPANDO   MRN-[id number]    CC: weakness, lethargy and UTI    HPI:  89 y/o female with dementia (AAOx3 in Oct 2019, w/ decline), HTN, DM, SSS s/p pacemaker, Osteoporosis, Glaucoma, Pulmonary Embolism and recent hospitalization at [facility] presenting with generalized weakness and lethargy. Patient hospitalized at [facility] with initial parotitis and MSSA bacteremia. Discharged home but presented to [facility] on 1/15/2020 with poor PO intake and generalized weakness since discharge. Admitted to ICU after being intubated for hypoxic respiratory failure and was treated with vancomycin and cefepime for HAP. She was also found to have a new left segmental PE pulmonary embolism, treated with enoxaparin and then switched to rivaroxaban. Patient initially w/ PICC line placed but MSSA bacteremia deemed by [facility] ID team to be contaminant and PICC line removed. Patient was discharged home yesterday, and given Xarelto for pulmonary embolism as per discharge instructions however not at the pharmacy when she went to pick it up today.     Patient's granddaughter reports she is surprised her grandmother was discharged yesterday and went to help her with the 24 hour aid today and patient was slumped over in wheelchair, but denies any head injury or LOC. Patient's granddaughter reports she feels that patient was not ready to be discharged and since discharge is persistently weak. Patient unable to verbalize complaints but, per family, patient with no fever, no cough, sore throat SOB, or pain since discharge. Reports at baseline she was AAOx3 and very active until around October 2019; since then she has had multiple hospitalizations and has persistently remained lethargic.     Subjective: pt resting comfortably in bed, NV and unable to follow commands Had Echo done this AM     ROS:    Dyspnea (Paul 0-10): 0                       N/V (Y/N): N                              Secretions (Y/N): N                Agitation(Y/N): Y - patient becomes intermittently agitated with moving, blood draws, physical exams - has received IM Haldol 1mg x 1   Pain (Y/N): pt denies   -Provocation/Palliation:  -Quality/Quantity:  -Radiating:  -Severity:  -Timing/Frequency:  -Impact on ADLs:    OTHER REVIEW OF SYSTEMS:  UNABLE TO OBTAIN  due to: AMS/NV      	   Opiate Naive (Y/N): Yes  -iStop reviewed (Y/N): Y on initial consultation  I Ref: [id number] no RX    ALLERGIES:  No Known Allergies    MEDICATIONS: REVIEWED  MEDICATIONS  (STANDING):  brimonidine 0.2% Ophthalmic Solution 1 Drop(s) Both EYES three times a day  dextrose 5% + lactated ringers. 1000 milliLiter(s) (70 mL/Hr) IV Continuous <Continuous>  dextrose 5%. 1000 milliLiter(s) (50 mL/Hr) IV Continuous <Continuous>  dextrose 50% Injectable 12.5 Gram(s) IV Push once  dextrose 50% Injectable 25 Gram(s) IV Push once  dextrose 50% Injectable 25 Gram(s) IV Push once  enoxaparin Injectable 65 milliGRAM(s) SubCutaneous every 12 hours  folic acid Injectable 1 milliGRAM(s) IV Push daily  insulin lispro (HumaLOG) corrective regimen sliding scale   SubCutaneous Before meals and at bedtime  latanoprost 0.005% Ophthalmic Solution 1 Drop(s) Both EYES at bedtime  levoFLOXacin IVPB 750 milliGRAM(s) IV Intermittent every 48 hours  potassium chloride  10 mEq/100 mL IVPB 10 milliEquivalent(s) IV Intermittent every 1 hour    MEDICATIONS  (PRN):  acetaminophen    Suspension .. 650 milliGRAM(s) Oral every 6 hours PRN Mild Pain (1 - 3)  dextrose 40% Gel 15 Gram(s) Oral once PRN Blood Glucose LESS THAN 70 milliGRAM(s)/deciliter  glucagon  Injectable 1 milliGRAM(s) IntraMuscular once PRN Glucose LESS THAN 70 milligrams/deciliter    PEx:   Vital Signs Last 24 Hrs  T(C): 36.7 (04 Feb 2020 09:45), Max: 36.7 (04 Feb 2020 09:45)  T(F): 98.1 (04 Feb 2020 09:45), Max: 98.1 (04 Feb 2020 09:45)  HR: 87 (04 Feb 2020 09:45) (61 - 87)  BP: 132/70 (04 Feb 2020 09:45) (132/70 - 177/77)  BP(mean): --  RR: 19 (04 Feb 2020 09:45) (19 - 20)  SpO2: 100% (04 Feb 2020 09:45) (96% - 100%)    General: Elderly AA female, lying in bed, awake and alert - smiling, NV but no intelligible conversation.   HEENT: PERRLA, MM dry  Neck: No LAD, No JVD  Back: Normal spine flexure, No CVA tenderness  Respiratory: On RA, normal inspiratory effort. Coarse crackles at both lung bases.   Cardiovascular: S1 and S2 present - no additional abnormal sounds or murmurs. PPM in place.   Gastrointestinal: Resisting physical examination but seemingly NT, ND, BS+  Extremities: 1-2+ edema in the b/l UE and LE.   Vascular: 2+ peripheral pulses  Neurological: A/O x 0. No obvious CN deficit. Not participating in strength exam, but moving all 4 limbs. Normal tone.  Lymph: : No LAD     LABS:                         7.3    2.13  )-----------( 65       ( 04 Feb 2020 06:52 )             21.8   02-04    143  |  112<H>  |  17  ----------------------------<  55<L>  3.7   |  20<L>  |  1.04    Ca    8.2<L>      04 Feb 2020 06:52  Phos  2.8     02-04  Mg     2.0     02-04    IMAGING:   EXAM:  ECHOCARDIOGRAM (CARDIOL)                          PROCEDURE DATE:  02/04/2020      CONCLUSIONS:     1. Hyperdynamic left ventricular systolic function.   2. Grade I left ventricular diastolic dysfunction.   3. Normal right ventricular size and systolic function.   4. Mildly dilated left atrium.   5. Mild-to-moderate tricuspid regurgitation.   6. Aortic sclerosis without significant stenosis.   7. Pulmonary artery systolic pressure is 36.00 mmHg.   8. No pericardial effusion.    Advanced Directives:     DNR/DNI      [facility]    Decision maker: Albert Kwasi [phone]  Legal surrogate: Albert Villalpando [phone] ( son)  other local contacts: Citlaly Luevano: (granddaughter): [phone]    GOALS OF CARE DISCUSSION       Palliative care info/counseling provided           Advanced Directives addressed please see Advance Care Planning Note           Documentation of GOC: for family meeting this afternoon           REFERRALS        Palliative Med        Unit SW/Case Mgmt              Speech/Swallow and Dietician (Active Consults)       Hospice

## 2020-02-04 NOTE — PROGRESS NOTE ADULT - SUBJECTIVE AND OBJECTIVE BOX
INTERVAL HPI/OVERNIGHT EVENTS: Patient hypothermic late last night. Warming blanket placed. Unable to obtain blood cultures, and grand-daughter at bedside requested that we defer blood draws.     Patient was seen and examined at bedside. Hypoglycemic in AM, given d50 w/improvement    Patient calmer this morning, however, minimally responsive.     Goals of care discussion held with palliative care team, family requests comfort measures only, comfort feeds are being started and blood draws are no longer ordered, she will receive antibiotics while inpatient, but she is planned for home hospice.     T(F): 98.1 (02-04-20 @ 16:59)  HR: 88 (02-04-20 @ 16:59)  BP: 105/57 (02-04-20 @ 16:59)  RR: 20 (02-04-20 @ 16:59)  SpO2: 100% (02-04-20 @ 16:59)    PHYSICAL EXAM:    Constitutional: female, elderly, frail, , anasarca   HEENT: arcus senilis, no JVD, MMM, edentulous  Respiratory: CTA b/l, however limited exam due to poor patient cooperation  Cardiovascular: RRR, normal S1S2, no M/R/G  Gastrointestinal: soft, patient comfortable  Extremities: Warm, well perfused, non-pitting edema, no clubbing  Neurological: AAOx0, unable to assess CN,  Skin: normal temperature, warm, dry    MEDICATIONS  (STANDING):  brimonidine 0.2% Ophthalmic Solution 1 Drop(s) Both EYES three times a day  dextrose 5% + lactated ringers. 1000 milliLiter(s) (70 mL/Hr) IV Continuous <Continuous>  dextrose 5%. 1000 milliLiter(s) (50 mL/Hr) IV Continuous <Continuous>  dextrose 50% Injectable 12.5 Gram(s) IV Push once  dextrose 50% Injectable 25 Gram(s) IV Push once  dextrose 50% Injectable 25 Gram(s) IV Push once  enoxaparin Injectable 65 milliGRAM(s) SubCutaneous every 12 hours  folic acid Injectable 1 milliGRAM(s) IV Push daily  insulin lispro (HumaLOG) corrective regimen sliding scale   SubCutaneous Before meals and at bedtime  latanoprost 0.005% Ophthalmic Solution 1 Drop(s) Both EYES at bedtime  levoFLOXacin IVPB 750 milliGRAM(s) IV Intermittent every 48 hours    MEDICATIONS  (PRN):  acetaminophen    Suspension .. 650 milliGRAM(s) Oral every 6 hours PRN Mild Pain (1 - 3)  dextrose 40% Gel 15 Gram(s) Oral once PRN Blood Glucose LESS THAN 70 milliGRAM(s)/deciliter  glucagon  Injectable 1 milliGRAM(s) IntraMuscular once PRN Glucose LESS THAN 70 milligrams/deciliter    Allergies    No Known Allergies    Intolerances    LABS:                         7.3    2.13  )-----------( 65       ( 04 Feb 2020 06:52 )             21.8     02-04    143  |  112<H>  |  17  ----------------------------<  55<L>  3.7   |  20<L>  |  1.04    Ca    8.2<L>      04 Feb 2020 06:52  Phos  2.8     02-04  Mg     2.0     02-04                Lactate, Blood: 1.8 mmol/L (02-04 @ 06:52)      RADIOLOGY, EKG & ADDITIONAL TESTS: Reviewed.

## 2020-02-04 NOTE — PROGRESS NOTE ADULT - PROBLEM SELECTOR PLAN 10
1) PCP Contacted on Admission: (Y/N) --> Name & Phone #: Dr. Tere Lackey [phone]  2) Date of Contact with PCP:  3) PCP Contacted at Discharge: (Y/N)  4) Summary of Handoff Given to PCP:   5) Post-Discharge Appointment Date and Location:

## 2020-02-05 VITALS — TEMPERATURE: 98 F | RESPIRATION RATE: 20 BRPM | HEART RATE: 94 BPM | OXYGEN SATURATION: 99 %

## 2020-02-05 LAB
GLUCOSE BLDC GLUCOMTR-MCNC: 137 MG/DL — HIGH (ref 70–99)
GLUCOSE BLDC GLUCOMTR-MCNC: 150 MG/DL — HIGH (ref 70–99)
GLUCOSE BLDC GLUCOMTR-MCNC: 151 MG/DL — HIGH (ref 70–99)
GLUCOSE BLDC GLUCOMTR-MCNC: 252 MG/DL — HIGH (ref 70–99)
GLUCOSE BLDC GLUCOMTR-MCNC: 32 MG/DL — CRITICAL LOW (ref 70–99)
GLUCOSE BLDC GLUCOMTR-MCNC: 88 MG/DL — SIGNIFICANT CHANGE UP (ref 70–99)

## 2020-02-05 PROCEDURE — 99233 SBSQ HOSP IP/OBS HIGH 50: CPT

## 2020-02-05 RX ORDER — DEXTROSE 50 % IN WATER 50 %
25 SYRINGE (ML) INTRAVENOUS ONCE
Refills: 0 | Status: COMPLETED | OUTPATIENT
Start: 2020-02-05 | End: 2020-02-05

## 2020-02-05 RX ORDER — HALOPERIDOL DECANOATE 100 MG/ML
0.5 INJECTION INTRAMUSCULAR EVERY 8 HOURS
Refills: 0 | Status: DISCONTINUED | OUTPATIENT
Start: 2020-02-05 | End: 2020-02-06

## 2020-02-05 RX ORDER — HALOPERIDOL DECANOATE 100 MG/ML
0.5 INJECTION INTRAMUSCULAR EVERY 12 HOURS
Refills: 0 | Status: DISCONTINUED | OUTPATIENT
Start: 2020-02-05 | End: 2020-02-06

## 2020-02-05 RX ORDER — SODIUM CHLORIDE 9 MG/ML
1000 INJECTION, SOLUTION INTRAVENOUS
Refills: 0 | Status: DISCONTINUED | OUTPATIENT
Start: 2020-02-05 | End: 2020-02-06

## 2020-02-05 RX ORDER — DEXTROSE 50 % IN WATER 50 %
50 SYRINGE (ML) INTRAVENOUS ONCE
Refills: 0 | Status: COMPLETED | OUTPATIENT
Start: 2020-02-05 | End: 2020-02-05

## 2020-02-05 RX ADMIN — SODIUM CHLORIDE 50 MILLILITER(S): 9 INJECTION, SOLUTION INTRAVENOUS at 22:26

## 2020-02-05 RX ADMIN — SODIUM CHLORIDE 50 MILLILITER(S): 9 INJECTION, SOLUTION INTRAVENOUS at 12:46

## 2020-02-05 RX ADMIN — Medication 2: at 09:10

## 2020-02-05 RX ADMIN — Medication 50 MILLILITER(S): at 14:30

## 2020-02-05 RX ADMIN — Medication 25 GRAM(S): at 14:30

## 2020-02-05 RX ADMIN — ENOXAPARIN SODIUM 65 MILLIGRAM(S): 100 INJECTION SUBCUTANEOUS at 06:01

## 2020-02-05 RX ADMIN — ENOXAPARIN SODIUM 65 MILLIGRAM(S): 100 INJECTION SUBCUTANEOUS at 19:01

## 2020-02-05 RX ADMIN — BRIMONIDINE TARTRATE 1 DROP(S): 2 SOLUTION/ DROPS OPHTHALMIC at 06:01

## 2020-02-05 RX ADMIN — HALOPERIDOL DECANOATE 0.5 MILLIGRAM(S): 100 INJECTION INTRAMUSCULAR at 19:00

## 2020-02-05 RX ADMIN — SODIUM CHLORIDE 70 MILLILITER(S): 9 INJECTION, SOLUTION INTRAVENOUS at 00:43

## 2020-02-05 RX ADMIN — Medication 25 GRAM(S): at 12:47

## 2020-02-05 RX ADMIN — BRIMONIDINE TARTRATE 1 DROP(S): 2 SOLUTION/ DROPS OPHTHALMIC at 14:31

## 2020-02-05 RX ADMIN — BRIMONIDINE TARTRATE 1 DROP(S): 2 SOLUTION/ DROPS OPHTHALMIC at 22:27

## 2020-02-05 RX ADMIN — Medication 1 MILLIGRAM(S): at 15:52

## 2020-02-05 RX ADMIN — LATANOPROST 1 DROP(S): 0.05 SOLUTION/ DROPS OPHTHALMIC; TOPICAL at 22:27

## 2020-02-05 NOTE — PROGRESS NOTE ADULT - PROBLEM SELECTOR PLAN 8
- Home Regimen: Metformin 500mg BID  - MISS  - CC Diet (Pureed) - patient could not participate in speech and swallow exam due to mental status.
- Home Regimen: Metformin 500mg BID  - MISS  - CC Diet (Pureed) - patient could not participate in speech and swallow exam due to mental status.
- Home Regimen: Metformin 500mg BID  - will discontinue sliding scale and finger sticks for comfort  - CC Diet (Pureed) - patient could not participate in speech and swallow exam due to mental status.
- Home Regimen: Metformin 500mg BID  - MISS  - CC Diet (Pureed) - patient could not participate in speech and swallow exam due to mental status.

## 2020-02-05 NOTE — PROGRESS NOTE ADULT - PROBLEM SELECTOR PLAN 9
F: None  E: Replete PRN  N: CC Diet Pureed   Prophylaxis: lovenox  Code: DNR/DNI  Dispo: [facility]
F: None  E: Replete PRN  N: CC Diet Pureed   Prophylaxis: none, patient is comfort care. Has a history of PE, previously on Xarelto.   Code: DNR/DNI  Dispo: [facility] ==> home hospice
F: None  E: Replete PRN  N: CC Diet Pureed   Prophylaxis: none, patient is comfort care. Has a history of PE, previously on Xarelto.   Code: DNR/DNI  Dispo: [facility] ==> home hospice
F: None  E: Replete PRN  N: CC Diet Pureed   Prophylaxis: Xarelto   Code: DNR/DNI  Dispo: [facility]

## 2020-02-05 NOTE — PROGRESS NOTE ADULT - SUBJECTIVE AND OBJECTIVE BOX
INTERVAL HPI/OVERNIGHT EVENTS: Patient hypoglycemic this AM given D50 x 2 and started on D5 w/improvement to 200s    Patient was seen and examined at bedside. Hypoglycemic in AM, given d50 w/improvement    Patient calmer this morning, however, minimally responsive.     Goals of care discussion held with palliative care team, family requests comfort measures only. Haldol standing started 2/5.     T(F): 97.7 (02-05-20 @ 09:07)  HR: 94 (02-05-20 @ 09:07)  BP: 112/68 (02-05-20 @ 05:01)  RR: 20 (02-05-20 @ 09:07)  SpO2: 99% (02-05-20 @ 09:07)    PHYSICAL EXAM:    Constitutional: female, elderly, frail, , anasarca   HEENT: arcus senilis, no JVD, MMM, edentulous  Respiratory: CTA b/l,  Cardiovascular: RRR, normal S1S2, no M/R/G  Gastrointestinal: soft, patient becomes agitated w/palpation but does not appear to be in pain  Extremities: Warm, well perfused, non-pitting edema, no clubbing  Neurological: AAOx0, unable to assess CN,  Skin: normal temperature, warm, dry    MEDICATIONS  (STANDING):  brimonidine 0.2% Ophthalmic Solution 1 Drop(s) Both EYES three times a day  dextrose 5%. 1000 milliLiter(s) (50 mL/Hr) IV Continuous <Continuous>  dextrose 5%. 1000 milliLiter(s) (50 mL/Hr) IV Continuous <Continuous>  dextrose 50% Injectable 12.5 Gram(s) IV Push once  dextrose 50% Injectable 25 Gram(s) IV Push once  dextrose 50% Injectable 25 Gram(s) IV Push once  enoxaparin Injectable 65 milliGRAM(s) SubCutaneous every 12 hours  folic acid Injectable 1 milliGRAM(s) IV Push daily  haloperidol    Injectable 0.5 milliGRAM(s) IV Push every 12 hours  insulin lispro (HumaLOG) corrective regimen sliding scale   SubCutaneous Before meals and at bedtime  latanoprost 0.005% Ophthalmic Solution 1 Drop(s) Both EYES at bedtime  levoFLOXacin IVPB 750 milliGRAM(s) IV Intermittent every 48 hours    MEDICATIONS  (PRN):  acetaminophen    Suspension .. 650 milliGRAM(s) Oral every 6 hours PRN Mild Pain (1 - 3)  dextrose 40% Gel 15 Gram(s) Oral once PRN Blood Glucose LESS THAN 70 milliGRAM(s)/deciliter  glucagon  Injectable 1 milliGRAM(s) IntraMuscular once PRN Glucose LESS THAN 70 milligrams/deciliter  haloperidol    Injectable 0.5 milliGRAM(s) IV Push every 8 hours PRN delirium    Allergies    No Known Allergies    Intolerances    Labs: no labs, comfort care

## 2020-02-05 NOTE — PROGRESS NOTE ADULT - PROBLEM SELECTOR PLAN 10
1) PCP Contacted on Admission: (Y/N) --> Name & Phone #: Dr. Tere Lackey [phone]  2) Date of Contact with PCP:  3) PCP Contacted at Discharge: (Y/N)  4) Summary of Handoff Given to PCP:   5) Post-Discharge Appointment Date and Location:

## 2020-02-05 NOTE — PROGRESS NOTE ADULT - SUBJECTIVE AND OBJECTIVE BOX
SAHRA VILLALPANDO   MRN-[id number]    CC: weakness, lethargy and UTI    HPI:  89 y/o female with dementia (AAOx3 in Oct 2019, w/ decline), HTN, DM, SSS s/p pacemaker, Osteoporosis, Glaucoma, Pulmonary Embolism and recent hospitalization at [facility] presenting with generalized weakness and lethargy. Patient hospitalized at [place] with initial parotitis and MSSA bacteremia. Discharged home but presented to [facility] on 1/15/2020 with poor PO intake and generalized weakness since discharge. Admitted to ICU after being intubated for hypoxic respiratory failure and was treated with vancomycin and cefepime for HAP. She was also found to have a new left segmental PE pulmonary embolism, treated with enoxaparin and then switched to rivaroxaban. Patient initially w/ PICC line placed but MSSA bacteremia deemed by [facility] ID team to be contaminant and PICC line removed. Patient was discharged home yesterday, and given Xarelto for pulmonary embolism as per discharge instructions however not at the pharmacy when she went to pick it up today.     Patient's granddaughter reports she is surprised her grandmother was discharged yesterday and went to help her with the 24 hour aid today and patient was slumped over in wheelchair, but denies any head injury or LOC. Patient's granddaughter reports she feels that patient was not ready to be discharged and since discharge is persistently weak. Patient unable to verbalize complaints but, per family, patient with no fever, no cough, sore throat SOB, or pain since discharge. Reports at baseline she was AAOx3 and very active until around October 2019; since then she has had multiple hospitalizations and has persistently remained lethargic.     Subjective: pt now off bearhugger as temp stable x 48 hrs. Continues with intermittent delirium     ROS:    Dyspnea (Paul 0-10): 0                       N/V (Y/N): N                              Secretions (Y/N): N                Agitation(Y/N): Y - patient becomes intermittently agitated with moving, blood draws, physical exams - has received IM Haldol 1mg x 1   Pain (Y/N): pt appears without moaning or grimacing on exam    -Provocation/Palliation:  -Quality/Quantity:  -Radiating:  -Severity:  -Timing/Frequency:  -Impact on ADLs:    OTHER REVIEW OF SYSTEMS:  UNABLE TO OBTAIN  due to: AMS/NV      	   Opiate Naive (Y/N): Yes  -iStop reviewed (Y/N): Y on initial consultation  I Ref: [id number] no RX    ALLERGIES:  No Known Allergies    MEDICATIONS: REVIEWED  MEDICATIONS  (STANDING):  brimonidine 0.2% Ophthalmic Solution 1 Drop(s) Both EYES three times a day  dextrose 5% + lactated ringers. 1000 milliLiter(s) (70 mL/Hr) IV Continuous <Continuous>  dextrose 5%. 1000 milliLiter(s) (50 mL/Hr) IV Continuous <Continuous>  dextrose 50% Injectable 12.5 Gram(s) IV Push once  dextrose 50% Injectable 25 Gram(s) IV Push once  dextrose 50% Injectable 25 Gram(s) IV Push once  enoxaparin Injectable 65 milliGRAM(s) SubCutaneous every 12 hours  folic acid Injectable 1 milliGRAM(s) IV Push daily  insulin lispro (HumaLOG) corrective regimen sliding scale   SubCutaneous Before meals and at bedtime  latanoprost 0.005% Ophthalmic Solution 1 Drop(s) Both EYES at bedtime  levoFLOXacin IVPB 750 milliGRAM(s) IV Intermittent every 48 hours  potassium chloride  10 mEq/100 mL IVPB 10 milliEquivalent(s) IV Intermittent every 1 hour    MEDICATIONS  (PRN):  acetaminophen    Suspension .. 650 milliGRAM(s) Oral every 6 hours PRN Mild Pain (1 - 3)  dextrose 40% Gel 15 Gram(s) Oral once PRN Blood Glucose LESS THAN 70 milliGRAM(s)/deciliter  glucagon  Injectable 1 milliGRAM(s) IntraMuscular once PRN Glucose LESS THAN 70 milligrams/deciliter    PEx:   Vital Signs Last 24 Hrs  T(C): 36.5 (05 Feb 2020 09:07), Max: 37 (04 Feb 2020 20:55)  T(F): 97.7 (05 Feb 2020 09:07), Max: 98.6 (04 Feb 2020 20:55)  HR: 94 (05 Feb 2020 09:07) (72 - 94)  BP: 112/68 (05 Feb 2020 05:01) (100/66 - 112/68)  BP(mean): --  RR: 20 (05 Feb 2020 09:07) (20 - 20)  SpO2: 99% (05 Feb 2020 09:07) (96% - 100%)    General: Elderly AA female, lying in bed, with delirium   HEENT: PERRLA, MM dry  Neck: No LAD, No JVD  Back: Normal spine flexure, No CVA tenderness  Respiratory: On RA, normal inspiratory effort. Coarse crackles at both lung bases.   Cardiovascular: S1 and S2 present - no additional abnormal sounds or murmurs. PPM in place.   Gastrointestinal: Resisting physical examination but seemingly NT, ND, BS+  Extremities: 1-2+ edema in the b/l UE and LE.   Neurological: A/O x 0. No obvious CN deficit. Not participating in strength exam, but moving all 4 limbs. Normal tone.  Psych: + delirium, spitting and attempting biting of staff   Lymph: : No LAD     LABS: no AM labs per GOC   IMAGING: no new imaging per GOC    EXAM:  ECHOCARDIOGRAM (CARDIOL)                          PROCEDURE DATE:  02/04/2020      CONCLUSIONS:     1. Hyperdynamic left ventricular systolic function.   2. Grade I left ventricular diastolic dysfunction.   3. Normal right ventricular size and systolic function.   4. Mildly dilated left atrium.   5. Mild-to-moderate tricuspid regurgitation.   6. Aortic sclerosis without significant stenosis.   7. Pulmonary artery systolic pressure is 36.00 mmHg.   8. No pericardial effusion.    Advanced Directives:     DNR/DNI      [facility]    Decision maker: Albert Villalpando [phone]  Legal surrogate: Albert Villalpando [phone] ( son)  other local contacts: Citlaly Luevano: (granddaughter): [phone]    GOALS OF CARE DISCUSSION       Palliative care info/counseling provided           Advanced Directives addressed please see Advance Care Planning Note           Documentation of GOC: pt for HH services in AM          REFERRALS        Palliative Med        Unit SW/Case Mgmt              Speech/Swallow and Dietician (Active Consults)       Hospice: referral made to [facility]

## 2020-02-06 LAB
CULTURE RESULTS: SIGNIFICANT CHANGE UP
CULTURE RESULTS: SIGNIFICANT CHANGE UP
SPECIMEN SOURCE: SIGNIFICANT CHANGE UP
SPECIMEN SOURCE: SIGNIFICANT CHANGE UP

## 2020-02-06 PROCEDURE — 84132 ASSAY OF SERUM POTASSIUM: CPT

## 2020-02-06 PROCEDURE — 84295 ASSAY OF SERUM SODIUM: CPT

## 2020-02-06 PROCEDURE — 83605 ASSAY OF LACTIC ACID: CPT

## 2020-02-06 PROCEDURE — 87486 CHLMYD PNEUM DNA AMP PROBE: CPT

## 2020-02-06 PROCEDURE — 96375 TX/PRO/DX INJ NEW DRUG ADDON: CPT | Mod: XU

## 2020-02-06 PROCEDURE — 83540 ASSAY OF IRON: CPT

## 2020-02-06 PROCEDURE — 85025 COMPLETE CBC W/AUTO DIFF WBC: CPT

## 2020-02-06 PROCEDURE — 82977 ASSAY OF GGT: CPT

## 2020-02-06 PROCEDURE — 82962 GLUCOSE BLOOD TEST: CPT

## 2020-02-06 PROCEDURE — 93306 TTE W/DOPPLER COMPLETE: CPT

## 2020-02-06 PROCEDURE — 36415 COLL VENOUS BLD VENIPUNCTURE: CPT

## 2020-02-06 PROCEDURE — 81001 URINALYSIS AUTO W/SCOPE: CPT

## 2020-02-06 PROCEDURE — 71045 X-RAY EXAM CHEST 1 VIEW: CPT

## 2020-02-06 PROCEDURE — 83735 ASSAY OF MAGNESIUM: CPT

## 2020-02-06 PROCEDURE — 51701 INSERT BLADDER CATHETER: CPT

## 2020-02-06 PROCEDURE — 85027 COMPLETE CBC AUTOMATED: CPT

## 2020-02-06 PROCEDURE — 84100 ASSAY OF PHOSPHORUS: CPT

## 2020-02-06 PROCEDURE — 85730 THROMBOPLASTIN TIME PARTIAL: CPT

## 2020-02-06 PROCEDURE — 82746 ASSAY OF FOLIC ACID SERUM: CPT

## 2020-02-06 PROCEDURE — 92610 EVALUATE SWALLOWING FUNCTION: CPT

## 2020-02-06 PROCEDURE — 84443 ASSAY THYROID STIM HORMONE: CPT

## 2020-02-06 PROCEDURE — 70450 CT HEAD/BRAIN W/O DYE: CPT

## 2020-02-06 PROCEDURE — 82607 VITAMIN B-12: CPT

## 2020-02-06 PROCEDURE — 80048 BASIC METABOLIC PNL TOTAL CA: CPT

## 2020-02-06 PROCEDURE — 83550 IRON BINDING TEST: CPT

## 2020-02-06 PROCEDURE — 93005 ELECTROCARDIOGRAM TRACING: CPT

## 2020-02-06 PROCEDURE — 82728 ASSAY OF FERRITIN: CPT

## 2020-02-06 PROCEDURE — 82330 ASSAY OF CALCIUM: CPT

## 2020-02-06 PROCEDURE — 87798 DETECT AGENT NOS DNA AMP: CPT

## 2020-02-06 PROCEDURE — 99285 EMERGENCY DEPT VISIT HI MDM: CPT | Mod: 25

## 2020-02-06 PROCEDURE — 87581 M.PNEUMON DNA AMP PROBE: CPT

## 2020-02-06 PROCEDURE — ZZZZZ: CPT

## 2020-02-06 PROCEDURE — 82803 BLOOD GASES ANY COMBINATION: CPT

## 2020-02-06 PROCEDURE — 83880 ASSAY OF NATRIURETIC PEPTIDE: CPT

## 2020-02-06 PROCEDURE — 87040 BLOOD CULTURE FOR BACTERIA: CPT

## 2020-02-06 PROCEDURE — 87086 URINE CULTURE/COLONY COUNT: CPT

## 2020-02-06 PROCEDURE — 87186 SC STD MICRODIL/AGAR DIL: CPT

## 2020-02-06 PROCEDURE — 99233 SBSQ HOSP IP/OBS HIGH 50: CPT

## 2020-02-06 PROCEDURE — 71250 CT THORAX DX C-: CPT

## 2020-02-06 PROCEDURE — 83036 HEMOGLOBIN GLYCOSYLATED A1C: CPT

## 2020-02-06 PROCEDURE — 87633 RESP VIRUS 12-25 TARGETS: CPT

## 2020-02-06 PROCEDURE — 96374 THER/PROPH/DIAG INJ IV PUSH: CPT | Mod: XU

## 2020-02-06 PROCEDURE — 80053 COMPREHEN METABOLIC PANEL: CPT

## 2020-02-06 PROCEDURE — 84484 ASSAY OF TROPONIN QUANT: CPT

## 2020-02-06 PROCEDURE — 85610 PROTHROMBIN TIME: CPT

## 2020-02-06 PROCEDURE — 82533 TOTAL CORTISOL: CPT

## 2020-02-06 RX ORDER — HALOPERIDOL DECANOATE 100 MG/ML
1 INJECTION INTRAMUSCULAR
Qty: 180 | Refills: 0
Start: 2020-02-06 | End: 2020-05-05

## 2020-02-06 RX ORDER — CALCIUM CARBONATE 500(1250)
1 TABLET ORAL
Qty: 0 | Refills: 0 | DISCHARGE

## 2020-02-06 RX ORDER — ASPIRIN/CALCIUM CARB/MAGNESIUM 324 MG
1 TABLET ORAL
Qty: 0 | Refills: 0 | DISCHARGE

## 2020-02-06 RX ORDER — RIVAROXABAN 15 MG-20MG
1 KIT ORAL
Qty: 0 | Refills: 0 | DISCHARGE

## 2020-02-06 RX ORDER — METFORMIN HYDROCHLORIDE 850 MG/1
1 TABLET ORAL
Qty: 0 | Refills: 0 | DISCHARGE

## 2020-02-06 RX ORDER — HALOPERIDOL DECANOATE 100 MG/ML
0.5 INJECTION INTRAMUSCULAR
Qty: 45 | Refills: 0
Start: 2020-02-06 | End: 2020-03-06

## 2020-02-06 RX ORDER — ATORVASTATIN CALCIUM 80 MG/1
1 TABLET, FILM COATED ORAL
Qty: 0 | Refills: 0 | DISCHARGE

## 2020-02-06 RX ADMIN — SODIUM CHLORIDE 50 MILLILITER(S): 9 INJECTION, SOLUTION INTRAVENOUS at 06:23

## 2020-02-06 RX ADMIN — ENOXAPARIN SODIUM 65 MILLIGRAM(S): 100 INJECTION SUBCUTANEOUS at 06:22

## 2020-02-06 RX ADMIN — BRIMONIDINE TARTRATE 1 DROP(S): 2 SOLUTION/ DROPS OPHTHALMIC at 06:23

## 2020-02-06 RX ADMIN — HALOPERIDOL DECANOATE 0.5 MILLIGRAM(S): 100 INJECTION INTRAMUSCULAR at 06:21

## 2020-02-06 NOTE — PROGRESS NOTE ADULT - PROBLEM SELECTOR PLAN 6
- PPM in place   - Holding Metoprolol 50mg BID in the setting of sepsis
Support provided to patient and family. Patient to have access to supportive services during rest of hospital stay as the patient/family deemed necessary ie. Chaplaincy, Massage therapy, Music therapy, Patient and family supportive services, Palliative SW, etc.   As discussed during the palliative IDT meeting and as identified during the patients PSSA screening the patient would benefit from: chaplaincy.
- PPM in place   - Holding Metoprolol 50mg BID in the setting of sepsis

## 2020-02-06 NOTE — DISCHARGE NOTE PROVIDER - NSDCMRMEDTOKEN_GEN_ALL_CORE_FT
bisacodyl 5 mg oral delayed release tablet: 1 tab(s) orally once a day  brimonidine 0.2% ophthalmic solution: 1 drop(s) to each affected eye 3 times a day  folic acid 1 mg oral tablet: 1 tab(s) orally once a day  Haldol 5 mg/mL injectable solution: 0.5 milligram(s) intramuscularly every 8 hours, As Needed   haloperidol 0.5 mg oral tablet: 1 tab(s) orally 2 times a day  Levaquin 750 mg oral tablet: 1 tab(s) orally once a day give evening of 2/7  metoprolol tartrate 50 mg oral tablet: 1 tab(s) orally 2 times a day  Xalatan 0.005% ophthalmic solution: 1 drop(s) to each affected eye once a day (in the evening) bisacodyl 5 mg oral delayed release tablet: 1 tab(s) orally once a day  brimonidine 0.2% ophthalmic solution: 1 drop(s) to each affected eye 3 times a day  folic acid 1 mg oral tablet: 1 tab(s) orally once a day  Haldol 5 mg/mL injectable solution: 0.5 milligram(s) intramuscularly every 8 hours, As Needed   haloperidol 0.5 mg oral tablet: 1 tab(s) orally 2 times a day  Levaquin 750 mg oral tablet: 1 tab(s) orally once a day give evening of 2/7  Levaquin 750 mg oral tablet: 1 tab(s) orally once on the evening of 2/7.   metoprolol tartrate 50 mg oral tablet: 1 tab(s) orally 2 times a day  Xalatan 0.005% ophthalmic solution: 1 drop(s) to each affected eye once a day (in the evening)

## 2020-02-06 NOTE — DISCHARGE NOTE PROVIDER - NSDCCPCAREPLAN_GEN_ALL_CORE_FT
PRINCIPAL DISCHARGE DIAGNOSIS  Diagnosis: Sepsis  Assessment and Plan of Treatment: You were admitted for severe sepsis in the setting of UTI. You are being treated with an antibiotic. Levofloxacin.      SECONDARY DISCHARGE DIAGNOSES  Diagnosis: Hypothermia  Assessment and Plan of Treatment: PRINCIPAL DISCHARGE DIAGNOSIS  Diagnosis: Sepsis  Assessment and Plan of Treatment: You were admitted for severe sepsis in the setting of UTI. You are being treated with an antibiotic, Levofloxacin. There was an initial concern for pneumonia, but based on the records from [facility], it appears that the findings on CT you received here are from a previously treated pneumonia. Your urine culture was positive, and we started antibiotic coverage directed at that bacteria. Given the progressive decline in your health over the past year, and the limited possibility of improvement, you and your family have elected for home hospice to prioritize comfort over interventions.      SECONDARY DISCHARGE DIAGNOSES  Diagnosis: Hypothermia  Assessment and Plan of Treatment: You had a low body temperature. This was likely due to your infection. You received a warming blanket and your temperature improved. This is also likely due to poor calorie intake in the hospital. Comfort feeding was started. These feeds will provide some calories to help prevent hypothermia, and the antibiotics will treat the main cause contributed to the low temperature.    Diagnosis: Functional quadriplegia  Assessment and Plan of Treatment: You have severe weakness in the setting of overall frailty due to infection. This prevents you from being able to bathe, clothe, and feed yourself. Home hospice services will help you with your basic needs.

## 2020-02-06 NOTE — PROGRESS NOTE ADULT - PROBLEM SELECTOR PLAN 4
- CXR illustrated b/l effusions and possible infiltrate, with consolidation ultimately confirmed on subsequent CT Chest. Already treated per [facility] records, likely source is UTI.   - Given recent hospital DC yesterday, will treat this as a hospital acquired pneumonia   - RVP negative  - Levofloxacin 750 q 48 hrs (CrCl)  - Patient at this time is on room air with appropriate respiratory status
- CXR illustrated b/l effusions and possible infiltrate, with consolidation ultimately confirmed on subsequent CT Chest. Already treated per [facility] records, likely source is UTI.   - Given recent hospital DC yesterday, will treat this as a hospital acquired pneumonia   - RVP negative  - Levofloxacin 750 q 48 hrs (CrCl)  - Patient at this time is on room air with appropriate respiratory status   - F/u daily CXR
- CXR illustrated b/l effusions and possible infiltrate, with consolidation ultimately confirmed on subsequent CT Chest. Already treated per [facility] records, likely source is UTI.   - Given recent hospital DC yesterday, will treat this as a hospital acquired pneumonia   - RVP negative  - Levofloxacin 750 q 48 hrs (CrCl)  - Patient at this time is on room air with appropriate respiratory status
Progressive decline and FTT in the setting of recurrent hospital stays and dementia, now with additional issue of dysphagia.   - Patient continuing S+S evaluation - was too agitated to comply. Per family, patient was able to tolerated pureed diet at home when assisted.   followed by speech and swallow. Will follow up with son re: LT feeding goals
Progressive decline and FTT in the setting of recurrent hospital stays and dementia, now with additional issue of dysphagia.   - Patient with poor appetite, combative when speech and swallow attempted multiple evaluations  Son wishes for comfort feeds. Discussed aspiration precautions
Progressive decline and FTT in the setting of recurrent hospital stays and dementia, now with additional issue of dysphagia.   - Patient with poor appetite, combative when speech and swallow attempted multiple evaluations  Son wishes for comfort feeds. Discussed aspiration precautions
- CT Chest demonstrated mild fat stranding - clinically, difficult to assess pain as patient is non-verbal, however, with palpation, patient actively tried to remove hand.   - Alk Phos elevated, ggt slightly elevated  - F/u RUQ sonogram (patient was too combative to participate in exam 2/2)  - C/w antibiotics as above for coverage of enteric pathogens   - Should this represent acute cholecystitis, family have determined they would not pursue surgery.

## 2020-02-06 NOTE — PROGRESS NOTE ADULT - PROBLEM SELECTOR PROBLEM 2
Agitation
Urinary tract infection
Pancytopenia

## 2020-02-06 NOTE — DISCHARGE NOTE PROVIDER - CARE PROVIDER_API CALL
[facility]/[address]  Phone: [phone]  Fax: (   )    -  Follow Up Time:

## 2020-02-06 NOTE — PROGRESS NOTE ADULT - REASON FOR ADMISSION
Severe Sepsis

## 2020-02-06 NOTE — PROGRESS NOTE ADULT - PROBLEM SELECTOR PLAN 5
- Patient with progressive decline in functional status since Oct 2019 with recurrent hospitalizations. Reportedly, further worsening after recent ICU stay post intubation.   - Family aware of this and understanding of age related dementia and worsening weakness related to hospitalizations.   - Decision made to continue DNR/DNI status   - Palliative care consult placed, patient's son is amenable to a discussion of GOC
- Patient with progressive decline in functional status since Oct 2019 with recurrent hospitalizations. Reportedly, further worsening after recent ICU stay post intubation.   - Family aware of this and understanding of age related dementia and worsening weakness related to hospitalizations.   - Decision made to continue DNR/DNI status   - Palliative care discussion held, comfort measures only
- Patient with progressive decline in functional status since Oct 2019 with recurrent hospitalizations. Reportedly, further worsening after recent ICU stay post intubation.   - Family aware of this and understanding of age related dementia and worsening weakness related to hospitalizations.   - Decision made to continue DNR/DNI status   - Palliative care discussion held, comfort measures only
Patient reportedly A&Ox3 in Oct 2019   - At this time, she is unintelligible, not participating in meaningful conversation, suspect this has been patient's baseline for some time.   FAST 7C  - Given severe dementia, patient might benefit with Home hospice  services. Will discuss with son
- Patient with progressive decline in functional status since Oct 2019 with recurrent hospitalizations. Reportedly, further worsening after recent ICU stay post intubation.   - Family aware of this and understanding of age related dementia and worsening weakness related to hospitalizations.   - Decision made to continue DNR/DNI status   - Palliative care consult placed, patient's son is amenable to a discussion of GOC

## 2020-02-06 NOTE — DISCHARGE NOTE NURSING/CASE MANAGEMENT/SOCIAL WORK - PATIENT PORTAL LINK FT
You can access the FollowMyHealth Patient Portal offered by [facility] by registering at the following website: [email or web address]. By joining [employer]’s FollowMyHealth portal, you will also be able to view your health information using other applications (apps) compatible with our system.

## 2020-02-06 NOTE — PROGRESS NOTE ADULT - PROBLEM SELECTOR PLAN 1
- Patient meeting SIRS criteria w/ hypothermia and leucopenia w/ source being attributed to a pneumonia, as evident on CT Chest with possible cholecystitis evident as well.   - Lactate 2.2 - cleared w/ 500cc NS  - Patient did not receive 30cc/kg fluid resuscitation given presence of b/l pleural effusions and elevated BNP. However, patient is warm w/ palpable pulses on exam.   - Levofloxacin 750 q 48 IV  - BCx ngtd  - UCx w/pseudomonas 80,000 CFU f/u sensitivities
- Patient meeting SIRS criteria w/ hypothermia and leucopenia w/ source being attributed to a pneumonia, as evident on CT Chest with possible cholecystitis evident as well.   - Lactate 2.2 - cleared w/ 500cc NS, repeat lactate 1.8  - Patient did not receive 30cc/kg fluid resuscitation given presence of b/l pleural effusions and elevated BNP. However, patient is warm w/ palpable pulses on exam.   - Levofloxacin 750 q 48 IV  - BCx ngtd  - UCx w/pseudomonas 80,000 CFU f/u sensitivities
- Patient meeting SIRS criteria w/ hypothermia and leucopenia w/ source being attributed to a pneumonia, as evident on CT Chest with possible cholecystitis evident as well.   - Lactate 2.2 - cleared w/ 500cc NS, repeat lactate 1.8  - Patient did not receive 30cc/kg fluid resuscitation given presence of b/l pleural effusions and elevated BNP. However, patient is warm w/ palpable pulses on exam.   - Levofloxacin 750 q 48 IV, last dose 2/5   - BCx ngtd  - UCx w/pseudomonas 80,000  - resolved
Continue with antibiotic treatment of suspected pneumonia vs biliary infection.   At this time, hypothermic, on warming blanket
Continue with antibiotic treatment of suspected pneumonia vs biliary infection.   D/C ABT on discharge home with hospice  [facility]/C [facility]BT on discharge home with hospice
- Patient meeting SIRS criteria w/ hypothermia and leucopenia w/ source being attributed to a pneumonia, as evident on CT Chest with possible cholecystitis evident as well.   - Lactate 2.2 - cleared w/ 500cc NS  - Patient did not receive 30cc/kg fluid resuscitation given presence of b/l pleural effusions and elevated BNP. However, patient is warm w/ palpable pulses on exam.   - UA negative   - Other potential source is her gallbladder, w/ CT reading stranding around organ and stones. Alk Phos and ggt elevated.   - patient repeatedly pulled out IVs, ordered levofloxacin 750 q 48 PO  - F/u blood cultures    #Demand Ischemia  - Initial troponinemia, since peaked in the setting of sepsis   - No distinct ischemic changes on EKG  - Elevated BNP w/ edema   - F/u ECHO

## 2020-02-06 NOTE — PROGRESS NOTE ADULT - PROBLEM SELECTOR PLAN 7
- Patient found to have a L subsegmental PE on her last hospitalization and records indicate she was meant to be discharged on Xarelto, which was not sent to the pharmacy.   - Xarelto 15mg BID - this is for 3 weeks, patient presumably has been on A/C for about 1 week during last hospitalization. Would continue for another 2 weeks and switch to 20mg OD dosing thereafter.   - Patient unable to swallow at this time, continue with Enoxaparin 65mg BID (treatment dose).
- Patient found to have a L subsegmental PE on her last hospitalization and records indicate she was meant to be discharged on Xarelto, which was not sent to the pharmacy.   - Xarelto 15mg BID - this is for 3 weeks, patient presumably has been on A/C for about 1 week during last hospitalization. Would continue for another 2 weeks and switch to 20mg OD dosing thereafter.   - Patient unable to swallow at this time, will discontinue lovenox for patient comfort.
- Patient found to have a L subsegmental PE on her last hospitalization and records indicate she was meant to be discharged on Xarelto, which was not sent to the pharmacy.   - Xarelto 15mg BID - this is for 3 weeks, patient presumably has been on A/C for about 1 week during last hospitalization. Would continue for another 2 weeks and switch to 20mg OD dosing thereafter.   - Patient unable to swallow at this time, will discontinue lovenox for patient comfort.
DNR/DNI  MOLST completed and in chart   For D/C home with HH services on 2/6
DNR/DNI  MOLST completed and in chart   For D/C home with HH today
On last hospitalization in January, DNR/DNI had been reversed by son at the time and patient was intubated and in the ICU. Family understands that they would not like this repeated and re-instated DNR/DNI on this hospitalization. Albert Kwasi (son) is primary decision maker Family discussion to be had regarding next steps in terms of what their hopes for their mother's care is and where they would like her to be. Home Hospice/ Inpatient Hospice would certainly be a consideration given severe dementia.    On last hospitalization in January, DNR/DNI had been reversed by son at the time and patient was intubated and in the ICU. Family understands that they would not like this repeated and re-instated DNR/DNI on this hospitalization. Albert Villalpando (son) is primary decision maker Family discussion to be had regarding next steps in terms of what their hopes for their mother's care is and where they would like her to be. Home Hospice/ Inpatient Hospice would certainly be a consideration given severe dementia.
- Patient found to have a L subsegmental PE on her last hospitalization and records indicate she was meant to be discharged on Xarelto, which was not sent to the pharmacy.   - Xarelto 15mg BID - this is for 3 weeks, patient presumably has been on A/C for about 1 week during last hospitalization. Would continue for another 2 weeks and switch to 20mg OD dosing thereafter.   - Patient unable to swallow at this time, continue with Enoxaparin 65mg BID (treatment dose).

## 2020-02-06 NOTE — DISCHARGE NOTE PROVIDER - HOSPITAL COURSE
Patient is __ yo M/F with past medical history of _____    Presented with _____, found to have _____    Problem List/Main Diagnoses (system-based):     Inpatient treatment course:     New medications:     Labs to be followed outpatient:     Exam to be followed outpatient: 89 y/o female with dementia (AAOx3 in Oct 2019, w/ decline), HTN, DM, SSS s/p pacemaker, Osteoporosis, Glaucoma, Pulmonary Embolism and recent hospitalization at [facility] presenting with generalized weakness and lethargy.     Problem List/Main Diagnoses (system-based):     Sepsis    Pseudomonas UTI    Failure to thrive    Functional Quadriplegia    Demand ischemia    Pancytopenia    Sick Sinus Syndrome        Inpatient treatment course: Admitted w/concern for HAP, however, PNA on CT was treated at [facility] w/a full course of antibiotics, CT findings at [facility] were attributed to imaging lag. She was then found to have a UTI w/pseudomonas. She was hypothermic to 93 and was started on a warming blanket. She was started on levofloxacin for pseudomonal coverage. Goals of care discussion were held with the family, and a decision was made to focus on palliative measures and the patient was discharged to home hospice.    New medications:     Haldol 5 mg/mL injectable solution: 0.5 milligram(s) intramuscularly every 8 hours, As Needed     haloperidol 0.5 mg oral tablet: 1 tab(s) orally 2 times a day    Levaquin 750 mg oral tablet: 1 tab(s) orally once a day give evening of 2/7    Levaquin 750 mg oral tablet: 1 tab(s) orally once on the evening of 2/7.         Labs to be followed outpatient: None    Exam to be followed outpatient: None

## 2020-02-06 NOTE — PROGRESS NOTE ADULT - PROBLEM SELECTOR PROBLEM 6
H/O sick sinus syndrome
Palliative care by specialist
H/O sick sinus syndrome

## 2020-02-06 NOTE — DISCHARGE NOTE PROVIDER - PROVIDER TOKENS
FREE:[LAST:[Dessalines],FIRST:[Tere],PHONE:[phone]:[(   [phone]    -],ADDRESS:[[address]]]

## 2020-02-06 NOTE — PROGRESS NOTE ADULT - PROBLEM SELECTOR PROBLEM 3
Functional quadriplegia
Pancytopenia
Hospital acquired PNA

## 2020-02-06 NOTE — PROGRESS NOTE ADULT - ASSESSMENT
89 y/o female with dementia (AAOx3 in Oct 2019, w/ decline), HTN, DM, SSS s/p pacemaker, Osteoporosis, Glaucoma, Pulmonary Embolism and recent hospitalization at [facility] presenting with generalized weakness and lethargy. Admitted for severe sepsis with source as yet unclear, but being treated for pneumonia and biliary source. She exhibits progressive functional decline and e/o severe dementia in the setting of recurrent hospitalizations.
89 y/o female with dementia (AAOx3 in Oct 2019, w/ decline), HTN, DM, SSS s/p pacemaker, osteoporosis, glaucoma  and recent hospitalization at [facility] presenting with generalized weakness and lethargy. Patient admitted for severe sepsis secondary to pneumonia, covering for hospital acquired sources.
89 y/o female with dementia (AAOx3 in Oct 2019, w/ decline), HTN, DM, SSS s/p pacemaker, osteoporosis, glaucoma  and recent hospitalization at [facility] presenting with generalized weakness and lethargy. Patient admitted for severe sepsis secondary to pneumonia, covering for hospital acquired sources. Last IV dose 2/5. Home hospice afterwards.
89 year old female with,    1. Severe dementia with behavioral disturbance- Started on haldol ATC, apprec Palliative recs and d/w them, plan for home hospice tomorrow, d/w SW/CM  2. Functional quadriplegia  3. Pancytopenia  4. Severe sepsis-POA due to Pseudomona's UTI- c/w levofloxacin, total of 5-7 days  5. History of recent PE- can dc lovenox and switch to DOAC    For home hospice tomorrow
89 y/o female with dementia (AAOx3 in Oct 2019, w/ decline), HTN, DM, SSS s/p pacemaker, Osteoporosis, Glaucoma, Pulmonary Embolism and recent hospitalization at [facility] presenting with generalized weakness and lethargy. Admitted for severe sepsis with source as yet unclear, but being treated for pneumonia and biliary source. She exhibits progressive functional decline and e/o severe dementia in the setting of recurrent hospitalizations.
89 y/o female with dementia (AAOx3 in Oct 2019, w/ decline), HTN, DM, SSS s/p pacemaker, Osteoporosis, Glaucoma, Pulmonary Embolism and recent hospitalization at [facility] presenting with generalized weakness and lethargy. Admitted for severe sepsis with source as yet unclear, but being treated for pneumonia and biliary source. She exhibits progressive functional decline and e/o severe dementia in the setting of recurrent hospitalizations.
89 y/o female with dementia (AAOx3 in Oct 2019, w/ decline), HTN, DM, SSS s/p pacemaker, osteoporosis, glaucoma  and recent hospitalization at [facility] presenting with generalized weakness and lethargy. Patient admitted for severe sepsis secondary to pneumonia, covering for hospital acquired sources.
89 y/o female with dementia (AAOx3 in Oct 2019, w/ decline), HTN, DM, SSS s/p pacemaker, osteoporosis, glaucoma  and recent hospitalization at [facility] presenting with generalized weakness and lethargy. Patient admitted for severe sepsis secondary to pneumonia, covering for hospital acquired sources.

## 2020-02-06 NOTE — PROGRESS NOTE ADULT - PROBLEM SELECTOR PLAN 2
Patient with 80,000 CFU Pseudomonas  -Levofloxacin 750 q 48 IV
Patient with 80,000 CFU Pseudomonas  -Levofloxacin 750 q 48 IV  -f/u sensitivities
Patient with 80,000 CFU Pseudomonas  -Levofloxacin 750 q 48 IV  -f/u sensitivities
pt less  delirious over past 24 hrs received 2 doses of Haldol in past 24 hrs   continue  Haldol 0.5 mg IV q 12 hr standing  and q 8 hr prn  Spoke with hospice liaison who will order Haldol liquid for pt once home
pt less combative today  No need for Haldol overnight  consider Haldol 0.5 mg IV q 8 hr prn
pt more delirious over past 24 hrs attempting to bite and spit at staff  please begin  Haldol 0.5 mg IV q 12 hr standing  and q 8 hr prn
- Patient with leucopenia, anemia, and thrombocytopenia   - No e/o bleeding or bruising   - Suspect related to marrow suppression in the setting of sepsis   - C/w enoxaparin for tx of PE - hold if platelet count drops further.   - Will work up anemia w/ iron studies (c/w AOCD), B12 (elevated), Folate (WNL), f/u retic ct

## 2020-02-06 NOTE — PROGRESS NOTE ADULT - PROBLEM SELECTOR PROBLEM 5
Dementia, senile
Failure to thrive syndrome, adult

## 2020-02-06 NOTE — PROGRESS NOTE ADULT - SUBJECTIVE AND OBJECTIVE BOX
SAHRA VILLALPANDO   MRN-[id number]    CC: weakness, lethargy and UTI    HPI:  89 y/o female with dementia (AAOx3 in Oct 2019, w/ decline), HTN, DM, SSS s/p pacemaker, Osteoporosis, Glaucoma, Pulmonary Embolism and recent hospitalization at [facility] presenting with generalized weakness and lethargy. Patient hospitalized at [place] with initial parotitis and MSSA bacteremia. Discharged home but presented to [facility] on 1/15/2020 with poor PO intake and generalized weakness since discharge. Admitted to ICU after being intubated for hypoxic respiratory failure and was treated with vancomycin and cefepime for HAP. She was also found to have a new left segmental PE pulmonary embolism, treated with enoxaparin and then switched to rivaroxaban. Patient initially w/ PICC line placed but MSSA bacteremia deemed by [facility] ID team to be contaminant and PICC line removed. Patient was discharged home yesterday, and given Xarelto for pulmonary embolism as per discharge instructions however not at the pharmacy when she went to pick it up today.     Patient's granddaughter reports she is surprised her grandmother was discharged yesterday and went to help her with the 24 hour aid today and patient was slumped over in wheelchair, but denies any head injury or LOC. Patient's granddaughter reports she feels that patient was not ready to be discharged and since discharge is persistently weak. Patient unable to verbalize complaints but, per family, patient with no fever, no cough, sore throat SOB, or pain since discharge. Reports at baseline she was AAOx3 and very active until around October 2019; since then she has had multiple hospitalizations and has persistently remained lethargic.     Subjective: pt resting comfortably this morning No further delirium      ROS:    Dyspnea (Paul 0-10): 0                       N/V (Y/N): N                              Secretions (Y/N): N                Agitation(Y/N):N  Pain (Y/N): pt appears without moaning or grimacing on exam    -Provocation/Palliation:  -Quality/Quantity:  -Radiating:  -Severity:  -Timing/Frequency:  -Impact on ADLs:    OTHER REVIEW OF SYSTEMS:  UNABLE TO OBTAIN  due to: AMS/NV      	   Opiate Naive (Y/N): Yes  -iStop reviewed (Y/N): Y on initial consultation  I Ref: [id number] no RX    ALLERGIES:  No Known Allergies    MEDICATIONS: REVIEWED  MEDICATIONS  (STANDING):  brimonidine 0.2% Ophthalmic Solution 1 Drop(s) Both EYES three times a day  dextrose 5% + lactated ringers. 1000 milliLiter(s) (70 mL/Hr) IV Continuous <Continuous>  dextrose 5%. 1000 milliLiter(s) (50 mL/Hr) IV Continuous <Continuous>  dextrose 50% Injectable 12.5 Gram(s) IV Push once  dextrose 50% Injectable 25 Gram(s) IV Push once  dextrose 50% Injectable 25 Gram(s) IV Push once  enoxaparin Injectable 65 milliGRAM(s) SubCutaneous every 12 hours  folic acid Injectable 1 milliGRAM(s) IV Push daily  insulin lispro (HumaLOG) corrective regimen sliding scale   SubCutaneous Before meals and at bedtime  latanoprost 0.005% Ophthalmic Solution 1 Drop(s) Both EYES at bedtime  levoFLOXacin IVPB 750 milliGRAM(s) IV Intermittent every 48 hours  potassium chloride  10 mEq/100 mL IVPB 10 milliEquivalent(s) IV Intermittent every 1 hour    MEDICATIONS  (PRN):  acetaminophen    Suspension .. 650 milliGRAM(s) Oral every 6 hours PRN Mild Pain (1 - 3)  dextrose 40% Gel 15 Gram(s) Oral once PRN Blood Glucose LESS THAN 70 milliGRAM(s)/deciliter  glucagon  Injectable 1 milliGRAM(s) IntraMuscular once PRN Glucose LESS THAN 70 milligrams/deciliter    PEx:   Vital Signs Last 24 Hrs  T(C): --  T(F): --  HR: --  BP: --  BP(mean): --  RR: --  SpO2: --    General: Elderly AA female, lying in bed, in NAD  HEENT: PERRLA, MM dry  Neck: No LAD, No JVD  Back: Normal spine flexure, No CVA tenderness  Respiratory: On RA, normal inspiratory effort. Coarse crackles at both lung bases.   Cardiovascular: S1 and S2 present - no additional abnormal sounds or murmurs. PPM in place.   Gastrointestinal: Resisting physical examination but seemingly NT, ND, BS+  Extremities: 1-2+ edema in the b/l UE and LE.   Neurological: A/O x 0. No obvious CN deficit. Not participating in strength exam, but moving all 4 limbs. Normal tone.  Psych: appears calmer   Lymph: : No LAD     LABS: no AM labs per GOC   IMAGING: no new imaging per GOC    EXAM:  ECHOCARDIOGRAM (CARDIOL)                          PROCEDURE DATE:  02/04/2020      CONCLUSIONS:     1. Hyperdynamic left ventricular systolic function.   2. Grade I left ventricular diastolic dysfunction.   3. Normal right ventricular size and systolic function.   4. Mildly dilated left atrium.   5. Mild-to-moderate tricuspid regurgitation.   6. Aortic sclerosis without significant stenosis.   7. Pulmonary artery systolic pressure is 36.00 mmHg.   8. No pericardial effusion.    Advanced Directives:     DNR/DNI      MOLST    Decision maker: Albert Villalpando [phone]  Legal surrogate: Albert Villalpando [phone] ( son)  other local contacts: Citlaly Luevano: (granddaughter): [phone]    GOALS OF CARE DISCUSSION       Palliative care info/counseling provided           Advanced Directives addressed please see Advance Care Planning Note           Documentation of GOC: pt for HH services        REFERRALS        Palliative Med        Unit SW/Case Mgmt              Speech/Swallow and Dietician (Active Consults)       Hospice: referral made to [facility]

## 2020-02-06 NOTE — PROGRESS NOTE ADULT - PROBLEM SELECTOR PROBLEM 4
Failure to thrive in adult
Hospital acquired PNA
R/O Cholecystitis

## 2020-02-06 NOTE — PROGRESS NOTE ADULT - PROBLEM SELECTOR PROBLEM 7
Advance care planning
Pulmonary embolus

## 2020-02-06 NOTE — PROGRESS NOTE ADULT - PROBLEM SELECTOR PLAN 3
- Patient with leucopenia, anemia, and thrombocytopenia   - No e/o bleeding or bruising   - Suspect related to marrow suppression in the setting of sepsis   - C/w enoxaparin for tx of PE - hold if platelet count drops further.   - Will work up anemia w/ iron studies (c/w AOCD), B12 (elevated), Folate (WNL), f/u retic ct
- Patient with leucopenia, anemia, and thrombocytopenia   - No e/o bleeding or bruising   - Suspect related to marrow suppression in the setting of sepsis, AOCD & ABI.   - C/w enoxaparin for tx of PE - hold if platelet count drops further.
- Patient with leucopenia, anemia, and thrombocytopenia   - No e/o bleeding or bruising   - Suspect related to marrow suppression in the setting of sepsis, AOCD & ABI.   - C/w enoxaparin for tx of PE - hold if platelet count drops further.
2/2 severe AMS and debility  pt boundbound for past few months   requires full care of all ADL's.
- CXR illustrated b/l effusions and possible infiltrate, with consolidation ultimately confirmed on subsequent CT Chest.   - Given recent hospital DC yesterday, will treat this as a hospital acquired pneumonia   - RVP negative  - Levofloxacin 750 q 48 hrs (CrCl)  - Patient at this time is on room air with appropriate respiratory status   - F/u daily CXR

## 2020-02-12 DIAGNOSIS — A41.9 SEPSIS, UNSPECIFIED ORGANISM: ICD-10-CM

## 2020-02-12 DIAGNOSIS — J18.9 PNEUMONIA, UNSPECIFIED ORGANISM: ICD-10-CM

## 2020-02-12 DIAGNOSIS — Z79.84 LONG TERM (CURRENT) USE OF ORAL HYPOGLYCEMIC DRUGS: ICD-10-CM

## 2020-02-12 DIAGNOSIS — I24.8 OTHER FORMS OF ACUTE ISCHEMIC HEART DISEASE: ICD-10-CM

## 2020-02-12 DIAGNOSIS — E11.9 TYPE 2 DIABETES MELLITUS WITHOUT COMPLICATIONS: ICD-10-CM

## 2020-02-12 DIAGNOSIS — I49.5 SICK SINUS SYNDROME: ICD-10-CM

## 2020-02-12 DIAGNOSIS — I10 ESSENTIAL (PRIMARY) HYPERTENSION: ICD-10-CM

## 2020-02-12 DIAGNOSIS — D61.818 OTHER PANCYTOPENIA: ICD-10-CM

## 2020-02-12 DIAGNOSIS — R62.7 ADULT FAILURE TO THRIVE: ICD-10-CM

## 2020-02-12 DIAGNOSIS — G93.40 ENCEPHALOPATHY, UNSPECIFIED: ICD-10-CM

## 2020-02-12 DIAGNOSIS — M81.0 AGE-RELATED OSTEOPOROSIS WITHOUT CURRENT PATHOLOGICAL FRACTURE: ICD-10-CM

## 2020-02-12 DIAGNOSIS — I26.99 OTHER PULMONARY EMBOLISM WITHOUT ACUTE COR PULMONALE: ICD-10-CM

## 2020-02-12 DIAGNOSIS — H40.9 UNSPECIFIED GLAUCOMA: ICD-10-CM

## 2020-02-12 DIAGNOSIS — Z95.0 PRESENCE OF CARDIAC PACEMAKER: ICD-10-CM

## 2020-02-12 DIAGNOSIS — R79.89 OTHER SPECIFIED ABNORMAL FINDINGS OF BLOOD CHEMISTRY: ICD-10-CM

## 2020-02-12 DIAGNOSIS — Z79.82 LONG TERM (CURRENT) USE OF ASPIRIN: ICD-10-CM

## 2020-02-12 DIAGNOSIS — R65.20 SEVERE SEPSIS WITHOUT SEPTIC SHOCK: ICD-10-CM

## 2020-02-12 DIAGNOSIS — R13.12 DYSPHAGIA, OROPHARYNGEAL PHASE: ICD-10-CM

## 2020-02-12 DIAGNOSIS — R53.2 FUNCTIONAL QUADRIPLEGIA: ICD-10-CM

## 2020-02-12 DIAGNOSIS — N39.0 URINARY TRACT INFECTION, SITE NOT SPECIFIED: ICD-10-CM

## 2020-02-12 DIAGNOSIS — Z66 DO NOT RESUSCITATE: ICD-10-CM

## 2020-02-12 DIAGNOSIS — B96.5 PSEUDOMONAS (AERUGINOSA) (MALLEI) (PSEUDOMALLEI) AS THE CAUSE OF DISEASES CLASSIFIED ELSEWHERE: ICD-10-CM

## 2020-02-12 DIAGNOSIS — F03.90 UNSPECIFIED DEMENTIA WITHOUT BEHAVIORAL DISTURBANCE: ICD-10-CM

## 2021-09-17 NOTE — DIETITIAN INITIAL EVALUATION ADULT. - PROBLEM SELECTOR PLAN 8
No lesions; no rash
- Home Regimen: Metformin 500mg BID  - MISS  - CC Diet (Pureed) - hold diet pending bedside dysphagia, S+S also ordered.

## 2024-06-18 NOTE — H&P ADULT - PROBLEM/PLAN-5
LVM attempting to reschedule the consult that was cancelled on 6/17/24 with Dr. Malhotra due to office being closed due to Dr. Gutiérrez.  Attempted to contact twice unable to reach.  Gave [phone] to reschedule.    DISPLAY PLAN FREE TEXT